# Patient Record
Sex: MALE | Race: WHITE
[De-identification: names, ages, dates, MRNs, and addresses within clinical notes are randomized per-mention and may not be internally consistent; named-entity substitution may affect disease eponyms.]

---

## 2017-06-22 ENCOUNTER — HOSPITAL ENCOUNTER (OUTPATIENT)
Dept: HOSPITAL 47 - LABWHC1 | Age: 62
Discharge: HOME | End: 2017-06-22
Payer: COMMERCIAL

## 2017-06-22 DIAGNOSIS — I25.10: Primary | ICD-10-CM

## 2017-06-22 DIAGNOSIS — E78.5: ICD-10-CM

## 2017-06-22 LAB
ALT SERPL-CCNC: 69 U/L (ref 21–72)
AST SERPL-CCNC: 27 U/L (ref 17–59)
CHOLEST SERPL-MCNC: 125 MG/DL (ref ?–200)
HDLC SERPL-MCNC: 32 MG/DL (ref 40–60)
TRIGL SERPL-MCNC: 172 MG/DL (ref ?–150)

## 2017-06-22 PROCEDURE — 84460 ALANINE AMINO (ALT) (SGPT): CPT

## 2017-06-22 PROCEDURE — 84450 TRANSFERASE (AST) (SGOT): CPT

## 2017-06-22 PROCEDURE — 80061 LIPID PANEL: CPT

## 2017-06-22 PROCEDURE — 36415 COLL VENOUS BLD VENIPUNCTURE: CPT

## 2022-01-13 ENCOUNTER — HOSPITAL ENCOUNTER (OUTPATIENT)
Dept: HOSPITAL 47 - LABWHC1 | Age: 67
Discharge: HOME | End: 2022-01-13
Attending: SURGERY
Payer: COMMERCIAL

## 2022-01-13 DIAGNOSIS — I71.4: ICD-10-CM

## 2022-01-13 DIAGNOSIS — Z01.812: Primary | ICD-10-CM

## 2022-01-13 LAB
ANION GAP SERPL CALC-SCNC: 10 MMOL/L
BASOPHILS # BLD AUTO: 0 K/UL (ref 0–0.2)
BASOPHILS NFR BLD AUTO: 1 %
BUN SERPL-SCNC: 23 MG/DL (ref 9–20)
CHLORIDE SERPL-SCNC: 96 MMOL/L (ref 98–107)
CO2 SERPL-SCNC: 29 MMOL/L (ref 22–30)
EOSINOPHIL # BLD AUTO: 0.2 K/UL (ref 0–0.7)
EOSINOPHIL NFR BLD AUTO: 2 %
ERYTHROCYTE [DISTWIDTH] IN BLOOD BY AUTOMATED COUNT: 4.86 M/UL (ref 4.3–5.9)
ERYTHROCYTE [DISTWIDTH] IN BLOOD: 13 % (ref 11.5–15.5)
HCT VFR BLD AUTO: 45 % (ref 39–53)
HGB BLD-MCNC: 15 GM/DL (ref 13–17.5)
LYMPHOCYTES # SPEC AUTO: 1.8 K/UL (ref 1–4.8)
LYMPHOCYTES NFR SPEC AUTO: 22 %
MCH RBC QN AUTO: 30.9 PG (ref 25–35)
MCHC RBC AUTO-ENTMCNC: 33.4 G/DL (ref 31–37)
MCV RBC AUTO: 92.5 FL (ref 80–100)
MONOCYTES # BLD AUTO: 0.4 K/UL (ref 0–1)
MONOCYTES NFR BLD AUTO: 5 %
NEUTROPHILS # BLD AUTO: 5.6 K/UL (ref 1.3–7.7)
NEUTROPHILS NFR BLD AUTO: 69 %
PLATELET # BLD AUTO: 279 K/UL (ref 150–450)
POTASSIUM SERPL-SCNC: 4.8 MMOL/L (ref 3.5–5.1)
SODIUM SERPL-SCNC: 135 MMOL/L (ref 137–145)
WBC # BLD AUTO: 8.1 K/UL (ref 3.8–10.6)

## 2022-01-13 PROCEDURE — 36415 COLL VENOUS BLD VENIPUNCTURE: CPT

## 2022-01-13 PROCEDURE — 80051 ELECTROLYTE PANEL: CPT

## 2022-01-13 PROCEDURE — 82565 ASSAY OF CREATININE: CPT

## 2022-01-13 PROCEDURE — 85025 COMPLETE CBC W/AUTO DIFF WBC: CPT

## 2022-01-13 PROCEDURE — 84520 ASSAY OF UREA NITROGEN: CPT

## 2022-01-18 ENCOUNTER — HOSPITAL ENCOUNTER (INPATIENT)
Dept: HOSPITAL 47 - 2ORMAIN | Age: 67
LOS: 2 days | Discharge: HOME | DRG: 269 | End: 2022-01-20
Attending: SURGERY | Admitting: SURGERY
Payer: COMMERCIAL

## 2022-01-18 VITALS — BODY MASS INDEX: 34.9 KG/M2

## 2022-01-18 DIAGNOSIS — I10: ICD-10-CM

## 2022-01-18 DIAGNOSIS — E11.51: ICD-10-CM

## 2022-01-18 DIAGNOSIS — I95.9: ICD-10-CM

## 2022-01-18 DIAGNOSIS — I67.9: ICD-10-CM

## 2022-01-18 DIAGNOSIS — Z86.79: ICD-10-CM

## 2022-01-18 DIAGNOSIS — I70.92: ICD-10-CM

## 2022-01-18 DIAGNOSIS — Z20.822: ICD-10-CM

## 2022-01-18 DIAGNOSIS — Z96.652: ICD-10-CM

## 2022-01-18 DIAGNOSIS — I70.222: ICD-10-CM

## 2022-01-18 DIAGNOSIS — I71.4: Primary | ICD-10-CM

## 2022-01-18 DIAGNOSIS — Z95.5: ICD-10-CM

## 2022-01-18 DIAGNOSIS — Z87.891: ICD-10-CM

## 2022-01-18 DIAGNOSIS — E78.5: ICD-10-CM

## 2022-01-18 DIAGNOSIS — I49.3: ICD-10-CM

## 2022-01-18 DIAGNOSIS — I74.3: ICD-10-CM

## 2022-01-18 DIAGNOSIS — I25.2: ICD-10-CM

## 2022-01-18 DIAGNOSIS — Z79.899: ICD-10-CM

## 2022-01-18 DIAGNOSIS — I72.3: ICD-10-CM

## 2022-01-18 DIAGNOSIS — I25.10: ICD-10-CM

## 2022-01-18 DIAGNOSIS — Z79.01: ICD-10-CM

## 2022-01-18 DIAGNOSIS — Z79.84: ICD-10-CM

## 2022-01-18 DIAGNOSIS — I48.0: ICD-10-CM

## 2022-01-18 DIAGNOSIS — Z79.82: ICD-10-CM

## 2022-01-18 LAB
ANION GAP SERPL CALC-SCNC: 12 MMOL/L
BASOPHILS # BLD AUTO: 0 K/UL (ref 0–0.2)
BASOPHILS # BLD AUTO: 0.1 K/UL (ref 0–0.2)
BASOPHILS NFR BLD AUTO: 0 %
BASOPHILS NFR BLD AUTO: 1 %
BUN SERPL-SCNC: 24 MG/DL (ref 9–20)
CALCIUM SPEC-MCNC: 9.6 MG/DL (ref 8.4–10.2)
CHLORIDE SERPL-SCNC: 98 MMOL/L (ref 98–107)
CO2 SERPL-SCNC: 27 MMOL/L (ref 22–30)
EOSINOPHIL # BLD AUTO: 0 K/UL (ref 0–0.7)
EOSINOPHIL # BLD AUTO: 0.2 K/UL (ref 0–0.7)
EOSINOPHIL NFR BLD AUTO: 0 %
EOSINOPHIL NFR BLD AUTO: 3 %
ERYTHROCYTE [DISTWIDTH] IN BLOOD BY AUTOMATED COUNT: 3.94 M/UL (ref 4.3–5.9)
ERYTHROCYTE [DISTWIDTH] IN BLOOD BY AUTOMATED COUNT: 5.04 M/UL (ref 4.3–5.9)
ERYTHROCYTE [DISTWIDTH] IN BLOOD: 13.5 % (ref 11.5–15.5)
ERYTHROCYTE [DISTWIDTH] IN BLOOD: 13.6 % (ref 11.5–15.5)
GLUCOSE BLD-MCNC: 158 MG/DL (ref 75–99)
GLUCOSE BLD-MCNC: 187 MG/DL (ref 75–99)
GLUCOSE BLD-MCNC: 205 MG/DL (ref 75–99)
GLUCOSE BLD-MCNC: 232 MG/DL (ref 75–99)
GLUCOSE SERPL-MCNC: 163 MG/DL (ref 74–99)
HCT VFR BLD AUTO: 36.8 % (ref 39–53)
HCT VFR BLD AUTO: 46.8 % (ref 39–53)
HGB BLD-MCNC: 12.1 GM/DL (ref 13–17.5)
HGB BLD-MCNC: 15.8 GM/DL (ref 13–17.5)
LYMPHOCYTES # SPEC AUTO: 0.7 K/UL (ref 1–4.8)
LYMPHOCYTES # SPEC AUTO: 2.3 K/UL (ref 1–4.8)
LYMPHOCYTES NFR SPEC AUTO: 26 %
LYMPHOCYTES NFR SPEC AUTO: 6 %
MCH RBC QN AUTO: 30.8 PG (ref 25–35)
MCH RBC QN AUTO: 31.3 PG (ref 25–35)
MCHC RBC AUTO-ENTMCNC: 33 G/DL (ref 31–37)
MCHC RBC AUTO-ENTMCNC: 33.8 G/DL (ref 31–37)
MCV RBC AUTO: 92.8 FL (ref 80–100)
MCV RBC AUTO: 93.3 FL (ref 80–100)
MONOCYTES # BLD AUTO: 0.2 K/UL (ref 0–1)
MONOCYTES # BLD AUTO: 0.6 K/UL (ref 0–1)
MONOCYTES NFR BLD AUTO: 2 %
MONOCYTES NFR BLD AUTO: 7 %
NEUTROPHILS # BLD AUTO: 10.3 K/UL (ref 1.3–7.7)
NEUTROPHILS # BLD AUTO: 5.3 K/UL (ref 1.3–7.7)
NEUTROPHILS NFR BLD AUTO: 62 %
NEUTROPHILS NFR BLD AUTO: 91 %
PLATELET # BLD AUTO: 206 K/UL (ref 150–450)
PLATELET # BLD AUTO: 287 K/UL (ref 150–450)
POTASSIUM SERPL-SCNC: 4.4 MMOL/L (ref 3.5–5.1)
SODIUM SERPL-SCNC: 137 MMOL/L (ref 137–145)
WBC # BLD AUTO: 11.3 K/UL (ref 3.8–10.6)
WBC # BLD AUTO: 8.6 K/UL (ref 3.8–10.6)

## 2022-01-18 PROCEDURE — 85730 THROMBOPLASTIN TIME PARTIAL: CPT

## 2022-01-18 PROCEDURE — 88304 TISSUE EXAM BY PATHOLOGIST: CPT

## 2022-01-18 PROCEDURE — 85027 COMPLETE CBC AUTOMATED: CPT

## 2022-01-18 PROCEDURE — 88311 DECALCIFY TISSUE: CPT

## 2022-01-18 PROCEDURE — 86901 BLOOD TYPING SEROLOGIC RH(D): CPT

## 2022-01-18 PROCEDURE — 047L3DZ DILATION OF LEFT FEMORAL ARTERY WITH INTRALUMINAL DEVICE, PERCUTANEOUS APPROACH: ICD-10-PCS

## 2022-01-18 PROCEDURE — 86850 RBC ANTIBODY SCREEN: CPT

## 2022-01-18 PROCEDURE — 04V03DZ RESTRICTION OF ABDOMINAL AORTA WITH INTRALUMINAL DEVICE, PERCUTANEOUS APPROACH: ICD-10-PCS

## 2022-01-18 PROCEDURE — 04CS0ZZ EXTIRPATION OF MATTER FROM LEFT POSTERIOR TIBIAL ARTERY, OPEN APPROACH: ICD-10-PCS

## 2022-01-18 PROCEDURE — 04CL0ZZ EXTIRPATION OF MATTER FROM LEFT FEMORAL ARTERY, OPEN APPROACH: ICD-10-PCS

## 2022-01-18 PROCEDURE — 047J3DZ DILATION OF LEFT EXTERNAL ILIAC ARTERY WITH INTRALUMINAL DEVICE, PERCUTANEOUS APPROACH: ICD-10-PCS

## 2022-01-18 PROCEDURE — 04CN0ZZ EXTIRPATION OF MATTER FROM LEFT POPLITEAL ARTERY, OPEN APPROACH: ICD-10-PCS

## 2022-01-18 PROCEDURE — 80048 BASIC METABOLIC PNL TOTAL CA: CPT

## 2022-01-18 PROCEDURE — 04CQ0ZZ EXTIRPATION OF MATTER FROM LEFT ANTERIOR TIBIAL ARTERY, OPEN APPROACH: ICD-10-PCS

## 2022-01-18 PROCEDURE — B41G1ZZ FLUOROSCOPY OF LEFT LOWER EXTREMITY ARTERIES USING LOW OSMOLAR CONTRAST: ICD-10-PCS

## 2022-01-18 PROCEDURE — 87635 SARS-COV-2 COVID-19 AMP PRB: CPT

## 2022-01-18 PROCEDURE — 85025 COMPLETE CBC W/AUTO DIFF WBC: CPT

## 2022-01-18 PROCEDURE — B41B1ZZ FLUOROSCOPY OF OTHER INTRA-ABDOMINAL ARTERIES USING LOW OSMOLAR CONTRAST: ICD-10-PCS

## 2022-01-18 PROCEDURE — 86900 BLOOD TYPING SEROLOGIC ABO: CPT

## 2022-01-18 RX ADMIN — CEFAZOLIN SCH: 330 INJECTION, POWDER, FOR SOLUTION INTRAMUSCULAR; INTRAVENOUS at 22:41

## 2022-01-18 RX ADMIN — HYDROMORPHONE HYDROCHLORIDE PRN MG: 1 INJECTION, SOLUTION INTRAMUSCULAR; INTRAVENOUS; SUBCUTANEOUS at 13:05

## 2022-01-18 RX ADMIN — CEFAZOLIN SCH: 330 INJECTION, POWDER, FOR SOLUTION INTRAMUSCULAR; INTRAVENOUS at 20:42

## 2022-01-18 RX ADMIN — POTASSIUM CHLORIDE SCH: 14.9 INJECTION, SOLUTION INTRAVENOUS at 20:41

## 2022-01-18 RX ADMIN — CEFAZOLIN SCH: 330 INJECTION, POWDER, FOR SOLUTION INTRAMUSCULAR; INTRAVENOUS at 22:42

## 2022-01-18 RX ADMIN — PRAVASTATIN SODIUM SCH: 40 TABLET ORAL at 20:43

## 2022-01-18 RX ADMIN — CEFAZOLIN SCH: 330 INJECTION, POWDER, FOR SOLUTION INTRAMUSCULAR; INTRAVENOUS at 20:41

## 2022-01-18 RX ADMIN — POTASSIUM CHLORIDE SCH MLS/HR: 14.9 INJECTION, SOLUTION INTRAVENOUS at 20:43

## 2022-01-18 RX ADMIN — INSULIN ASPART SCH UNIT: 100 INJECTION, SOLUTION INTRAVENOUS; SUBCUTANEOUS at 23:12

## 2022-01-18 RX ADMIN — CEFAZOLIN SCH: 330 INJECTION, POWDER, FOR SOLUTION INTRAMUSCULAR; INTRAVENOUS at 22:40

## 2022-01-18 RX ADMIN — HYDROMORPHONE HYDROCHLORIDE PRN MG: 1 INJECTION, SOLUTION INTRAMUSCULAR; INTRAVENOUS; SUBCUTANEOUS at 12:44

## 2022-01-18 NOTE — IR
EXAMINATION TYPE: IR stent intravas non coronary

 

DATE OF EXAM: 1/18/2022

 

COMPARISON: NONE

 

HISTORY: Fluoroscopy time.

 

Fluoroscopy was provided to the referring clinician.

## 2022-01-18 NOTE — P.OP
Date of Procedure: 01/18/22


Description of Procedure: 


Date: 01/18/2022





Preoperative diagnosis: Asymptomatic 0.1 cm Infrarenal abdominal aortic 

aneurysm, 2.8 cm left common iliac aneurysm


Postoperative diagnosis: Same


Procedure: Percutaneous Endovascular aortic repair with AFX II device under 

ultrasound guided access


Surgeon: Markell Costello DO


Assistant: none


Anesthesia:  GETA


EBL: 200 mL


Complications: None


Condition: Stable


Disposition:  Palpable femoral pulses bilaterally, monophasic DP signal 

bilaterally





Indication for procedure:  67-year-old gentleman with history of peripheral 

vascular disease and significant claudication in his left lower extremity 

presents to the hospital for endovascular aortic repair due to aneurysm noted on

recent CT angiogram measuring up to 6.1 cm.  He also has a left common iliac 

artery aneurysm measuring up to 2.8 cm.  Due to the fact that he has significant

atherosclerotic disease and claudication with occlusions noted in the distal SFA

as well as popliteal arteries it was determined that he would require an AFX 

graft to allow for up and over access in the future for his peripheral arterial 

disease.  He presents today for such procedure.





Operative Narrative:  After written and informed consent was obtained the 

patient all risks benefits and complications were described the patient is 

brought to the Cath Lab and laid in the supine position.  The area of the groins

were prepped and draped in usual sterile fashion after appropriate anesthetic 

was performed per the anesthesiologist.  A timeout was performed in normal 

fashion antibiotics were administered prior to accessed.  Under ultrasound 

guidance bilateral common femoral arteries were accessed and utilizing Seldinger

technique an 8-Georgian sheath was placed in the left femoral artery after 

Perclose closure device was placed 2  and 2 Perclose closure devices were 

placed in the right femoral artery followed by an 8-Georgian sheath.  035 

Glidewire was then placed through the 8-Georgian sheath and replaced with a stiff 

Lunderquist wire through a comfy catheter.  035 guidewire was then placed in the

7-Georgian sheath followed by pigtail catheter above the renal arteries at 

approximately L1-L2 vertebra level.  Guidewire was removed and aortogram was 

obtained.  The patient was then administered heparin and followed with serial 

ACTs for appropriate heparinization.  Attention was then first placed to the 

left common iliac artery and a 28 flared ovation limb measuring 100 mm was then 

deployed at the left common iliac artery and attempt to foreshorten the limb was

performed.  This was deployed with some coverage of the internal iliac artery 

and therefore multiple balloons and wires were then utilized to attempt to push 

the graft up into the iliac arteries aneurysm.  Once the flared graft was up 

above the internal iliac artery attention was then placed to the AFX graft 

deployment.  The 8-Georgian sheath was then removed and 19-Georgian AFX introducer 

sheath was guided over the stiff wire to the area above the bifurcation.  A 28 x

1 20 mm AFX2 bifurcated device was then guided onto the stiff wire.  The 

contralateral wire was then placed and through the 7-Georgian sheath it was then 

snared and brought out to the contralateral ovation sheath.  The main body of 

the AFX was then advanced to above the bifurcation in normal fashion and 

deployed above the bifurcation ultimately then pulled down to the bifurcation.  

Once main body was deployed attention was then placed to the contralateral limb 

which was deployed into the existing stent graft by removing the yellow loop 

cover.  A pigtail catheter was then placed over the wire and the contralateral 

wire was unlocked with the pigtail catheter.  Pigtail catheter was then placed 

above the renal arteries.  The ipsilateral limb was then obtained and the inner 

core and retracting AFX introducer sheath to deployed the ipsilateral limb.  

This inner deployment was removed and the obturator was placed and the sheath 

was then advanced through the above the renal arteries.  A 34 x 100 mm 

suprarenal AFX [place] cuff was then deployed beneath the renal arteries once land

ing zone was visualized.  Once completed the deployment sheath was removed and a

Q50 balloon was placed through the AFX introducer sheath and the overlap was 

ballooned.  The pigtail catheter was then withdrawn and placed within the [place] 

cuff in usual fashion.  Final angiogram was obtained demonstrating good seal 

with no evidence of endoleak.  All guidewires and catheters were then removed 

and the Perclose closure device was utilized for hemostasis for the bilateral 

femoral arteries.  There was still some bleeding at the left common femoral 

artery and therefore Angio-Seal was placed.  Bleeding did slow with pressure but

a FemoStop was placed to assist with hemostasis.  Once hemostatic the areas were

then cleansed and dressings were placed.  The patient tolerated the procedure 

well and had palpable femoral pulses at the conclusion of the procedure.  He was

then sent to PACU for recovery.

## 2022-01-18 NOTE — P.PN
Progress Note - Text


Progress Note Date: 01/18/22





called to the PACU secondary to patient complaining of pain in his left leg.  

Fem-stop removed without any pulse noted in the groin.  Left leg starting to 

mottle slightly.  Patient states he is unable to move left foot and leg.  Foot 

is cool to the touch. 





To operating room emergently for open thrombectomy and possible endarterectomy 

and iliac stenting.





Patient and wife updated.

## 2022-01-18 NOTE — P.OP
Date of Procedure: 01/18/22


Preoperative Diagnosis: 


Left lower extremity critical limb ischemia


Postoperative Diagnosis: 


Left lower extremity critical limb ischemia


 Left Iliac, femoral, popliteal artery thrombosis


 Chronic left SFA occlusion with reconstitution above knee popliteal artery


 Chronic tibial occlusive disease 


 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


Procedure(s) Performed: 





1.  Left femoral artery cutdown


2.  Open Iliac, femoral, popliteal and tibial thrombectomy


3.  Left femoral endarterectomy with patch angioplasty


4.  Selective left iliac, femoral angiogram


5.  Transluminal balloon angioplasty of the left external iliac artery with an 

8x20mm [place] balloon


6.  Transluminal stenting of the left external iliac and femoral artery with 

[id number] self expanding uncovered stent and [id number] Viabahn covered stent


7.  Selective left femoral, popliteal and tibial angiogram 2nd order


8.  Transluminal thrombectomy with Penumbra CAT8 device of the superficial 

femoral, popliteal and tibial arteries.


9.  Transluminal balloon angioplasty of the left superficial femoral artery


10. Transluminal stenting of the left superficial femoral artery with a [id number] 

Zilver PTX


11. Thrombolysis of the left tibial vessels


12. Incisional vac placement


[facility]


Surgeon: Markell Costello


Estimated Blood Loss (ml): 550


IV fluids (ml): 1,600


Urine output (ml): 600


Pathology: other (femoral plaque)


Condition: stable


Disposition: PACU


Indications for Procedure: 


67 year old gentleman with history of severe peripheral arterial disease and 

claudication whom underwent EVAR earlier in the day was found to have decreased 

blood flow to the left lower extremity.  Patient was examined and had absent 

femoral pulse on the left which was a change from post surgery.  He was also 

complaining of pain and his left leg began to mottle and therefore was taken to 

the operating room for an emergent thrombectomy and possible stenting.  When 

discussed with his wife she asked if we could fix both legs because he has been 

dealing with lower extremity pain for several months.


Operative Findings: 


dense calcified external iliac, femoral, profundus femoris, SFA and popliteal 

arteries.  Chronic occlusion of the tibial arteries with large collateralization

below the knee.


Description of Procedure: 


The patient was brought to the operative suite in an emergent fashion and laid 

in a supine position.  The area of the abdomen and left lower extremity were 

prepped and draped in the usual fashion.  Antibiotics were given and a timeout 

was performed in usual fashion.  A vertical incision was created at the left 

groin with a 10 blade scalpel and dissection was carried down to the common 

femoral artery.  The perclose and angioseal was removed and the common femoral, 

profunda and superficial femoral arteries were dissected free and controlled 

with vessel loops.  The femoral artery was severely calcified and no pulse was 

noted.  There was a pulse above this area at the level of the external iliac 

artery but dense calcification was also noted at that level.  Patient was given 

heparin and followed with serial ACTs and redosed as needed.  Proximal and 

distal control was then obtained and an arteriotomy was created with an 11 blade

scalpel and extended with Nguyễn Pinedo scissors.  Thrombus was encountered and 

thrombectomy was performed with a 4 valentine for the SFA and 5 valentine for the 

iliac artery with some thrombus removed.  Minimal backbleeding was noted.  Due 

to the severe calcification and disease an endarterectomy was performed and 

plaque was removed in usual fashion, profundus femoris has severe plaque at the 

orifice and eversion technique was performed to remove.  An improved pulse was 

noted proximally and patch angioplasty was performed with 6-0 prolene suture in 

a running fashion and a .8x8cm patch.  Once completed and all control was 

released there was diminished pulse and decision was made to access the patch 

and perform an angiogram.  An 8F sheath was placed after the patch was entered 

with a multipurpose needle and wire was placed.  Retrograde angiogram was 

obtained demonstrating diminished flow throughout the external iliac artery.  An

8x20mm balloon was then chosen to perform an angioplasty at the areas of 

stenosis and disease.  A [id number] uncovered stent was placed across the external 

iliac artery into the previous EVAR stent.  There was some improvement of flow 

but the external iliac femoral artery junction demonstrated severe plaque and 

slow flow.  A 10x100 Viabahn stent was then placed and extended into the patch. 

Bounding femoral pulse was noted and selective angiogram was then performed of 

the left leg.  Upon angiogram there was a chronic occlusion of the left SFA but 

there was dense thrombus at the reconstitution segment of the popliteal artery. 

A guidewire was then placed across the lesion and an over the wire was placed 

again with unsuccessful removal of the thrombus.  Due to the severity of the 

thrombus and critical nature an 8 Penumbra catheter was placed and suction 

thrombectomy was performed once the SFA lesion was crossed and ballooned.  

Multiple passes were performed with good removal of thrombus.  The lesion in the

SFA was severe and the decision to stent was made and a [id number] Zilver PTX stent

was placed.  Angiogram was obtained with brisk flow to the popliteal artery but 

there were severe collateral vessels with poor flow to the foot.  TPA and nitro 

were then administered to try to increase flow.  The patients foot was becoming 

pink with some capillary refill and therefore the procedure was concluded.  All 

guidewires and catheters were removed and sheath was removed.  The arteriotomy 

site was closed with a 6-0 prolene suture.  The area was copiously irrigated 

with antibiotic solution and hemostasis was controlled with Surgicel.  The 

incision was then closed in a multi-layer fashion and skin was cleansed and 

Provena incisional vac was placed.  The patient tolerated the procedure and had 

a palpable femoral and popliteal pulse at the conclusion of the procedure.  

There was slow capillary refill in the foot but the foot was pink.  He was sent 

to PACU for recovery and started on a heparin drip.

## 2022-01-19 LAB
ANION GAP SERPL CALC-SCNC: 9 MMOL/L
BUN SERPL-SCNC: 21 MG/DL (ref 9–20)
CALCIUM SPEC-MCNC: 8 MG/DL (ref 8.4–10.2)
CHLORIDE SERPL-SCNC: 105 MMOL/L (ref 98–107)
CO2 SERPL-SCNC: 21 MMOL/L (ref 22–30)
ERYTHROCYTE [DISTWIDTH] IN BLOOD BY AUTOMATED COUNT: 3.69 M/UL (ref 4.3–5.9)
ERYTHROCYTE [DISTWIDTH] IN BLOOD: 13.8 % (ref 11.5–15.5)
GLUCOSE BLD-MCNC: 162 MG/DL (ref 75–99)
GLUCOSE BLD-MCNC: 188 MG/DL (ref 75–99)
GLUCOSE BLD-MCNC: 192 MG/DL (ref 75–99)
GLUCOSE BLD-MCNC: 197 MG/DL (ref 75–99)
GLUCOSE SERPL-MCNC: 199 MG/DL (ref 74–99)
HCT VFR BLD AUTO: 34.5 % (ref 39–53)
HGB BLD-MCNC: 11.6 GM/DL (ref 13–17.5)
MCH RBC QN AUTO: 31.3 PG (ref 25–35)
MCHC RBC AUTO-ENTMCNC: 33.5 G/DL (ref 31–37)
MCV RBC AUTO: 93.4 FL (ref 80–100)
PLATELET # BLD AUTO: 219 K/UL (ref 150–450)
POTASSIUM SERPL-SCNC: 4.3 MMOL/L (ref 3.5–5.1)
SODIUM SERPL-SCNC: 135 MMOL/L (ref 137–145)
WBC # BLD AUTO: 11.3 K/UL (ref 3.8–10.6)

## 2022-01-19 RX ADMIN — INSULIN ASPART SCH UNIT: 100 INJECTION, SOLUTION INTRAVENOUS; SUBCUTANEOUS at 20:15

## 2022-01-19 RX ADMIN — APIXABAN SCH MG: 2.5 TABLET, FILM COATED ORAL at 20:15

## 2022-01-19 RX ADMIN — CEFAZOLIN SCH: 330 INJECTION, POWDER, FOR SOLUTION INTRAMUSCULAR; INTRAVENOUS at 09:17

## 2022-01-19 RX ADMIN — CEFAZOLIN SCH: 330 INJECTION, POWDER, FOR SOLUTION INTRAMUSCULAR; INTRAVENOUS at 09:23

## 2022-01-19 RX ADMIN — CEFAZOLIN SCH: 330 INJECTION, POWDER, FOR SOLUTION INTRAMUSCULAR; INTRAVENOUS at 09:22

## 2022-01-19 RX ADMIN — CEFAZOLIN SCH: 330 INJECTION, POWDER, FOR SOLUTION INTRAMUSCULAR; INTRAVENOUS at 09:19

## 2022-01-19 RX ADMIN — CEFAZOLIN SCH: 330 INJECTION, POWDER, FOR SOLUTION INTRAMUSCULAR; INTRAVENOUS at 09:21

## 2022-01-19 RX ADMIN — CEFAZOLIN SCH: 330 INJECTION, POWDER, FOR SOLUTION INTRAMUSCULAR; INTRAVENOUS at 09:18

## 2022-01-19 RX ADMIN — INSULIN ASPART SCH: 100 INJECTION, SOLUTION INTRAVENOUS; SUBCUTANEOUS at 05:57

## 2022-01-19 RX ADMIN — CEFAZOLIN SCH: 330 INJECTION, POWDER, FOR SOLUTION INTRAMUSCULAR; INTRAVENOUS at 09:24

## 2022-01-19 RX ADMIN — CEFAZOLIN SCH: 330 INJECTION, POWDER, FOR SOLUTION INTRAMUSCULAR; INTRAVENOUS at 09:12

## 2022-01-19 RX ADMIN — PRAVASTATIN SODIUM SCH MG: 40 TABLET ORAL at 20:15

## 2022-01-19 RX ADMIN — CEFAZOLIN SCH: 330 INJECTION, POWDER, FOR SOLUTION INTRAMUSCULAR; INTRAVENOUS at 09:13

## 2022-01-19 RX ADMIN — INSULIN ASPART SCH UNIT: 100 INJECTION, SOLUTION INTRAVENOUS; SUBCUTANEOUS at 17:56

## 2022-01-19 RX ADMIN — PIOGLITAZONE SCH MG: 15 TABLET ORAL at 09:28

## 2022-01-19 RX ADMIN — CEFAZOLIN SCH: 330 INJECTION, POWDER, FOR SOLUTION INTRAMUSCULAR; INTRAVENOUS at 09:20

## 2022-01-19 RX ADMIN — METOPROLOL TARTRATE SCH: 25 TABLET, FILM COATED ORAL at 17:56

## 2022-01-19 RX ADMIN — INSULIN ASPART SCH UNIT: 100 INJECTION, SOLUTION INTRAVENOUS; SUBCUTANEOUS at 11:55

## 2022-01-19 RX ADMIN — CEFAZOLIN SCH: 330 INJECTION, POWDER, FOR SOLUTION INTRAMUSCULAR; INTRAVENOUS at 09:11

## 2022-01-19 NOTE — P.CNPUL
History of Present Illness


Consult date: 01/19/22


Requesting physician: Markell Costello


Reason for consult: other (ICU management)


Chief complaint: Claudications in bilateral lower extremity, pain upon walking.


History of present illness: 





This is a 67-year-old white male with history of multiple medical problems 

including hypertension, coronary artery disease, severe peripheral vessel 

occlusive disease, and the patient presented with bilateral lower extremities 

claudications, pain upon walking even a few steps.  Patient had endovascular aor

tic repair due to aneurysm noted on recent angiogram measuring about 6.1 cm, 

patient underwent left common iliac artery aneurysm measuring 2.8 cm, patient 

had endovascular aortic repair with AFX11 device, postoperatively, patient was 

admitted to the intensive care unit, and I was asked to see him on consultation.

 During my evaluation, patient was on heparin drip, he was feeling much better, 

patient had no hemodynamic issues, no pulmonary issues, and I believe the 

patient is actually being considered to be discharged home today.  Most likely 

the patient will be discharged home on Eliquis and aspirin.  Patient has no 

shortness of breath no cough no wheezing no chest pain.  Apparently the patient 

was initially seen on January 11 by vascular surgery, and at that time he was 

noted to have abnormal CT angiogram of the abdomen and pelvis, clearly 

demonstrated a large abdominal aortic aneurysm.  The aneurysm measured 6.1 cm, 

and the patient was only able to walk less than 100 feet without significant 

pain in lower extremities.





Review of Systems





Constitutional: Negative.


HEENT: Negative.


Cardiac: Negative.


GI: Negative.


Genitourinary: Negative.


Musculoskeletal: Mostly severe claudications and pain upon walking


Endocrine: Negative


Hematologic: Negative


Psychiatric: Negative


Neurologic: Negative


Skin: Negative





Past Medical History


Past Medical History: Diabetes Mellitus, Hyperlipidemia, Hypertension, 

Myocardial Infarction (MI), Vascular Disorder


Additional Past Medical History / Comment(s): "two bad"  DISCS WITH BACK PAIN,  

VARICOSE VEINS, "abdominal" aneurysm


Last Myocardial Infarction Date:: 2013


History of Any Multi-Drug Resistant Organisms: None Reported


Past Surgical History: Heart Catheterization With Stent, Joint Replacement, 

Orthopedic Surgery


Additional Past Surgical History / Comment(s): LEFT SHOULDER Rotator cuff, 

VARICOSE VEINS, partial left knee replacement, arthroscopy madeline knee one cardiac 

stent


Past Anesthesia/Blood Transfusion Reactions: No Reported Reaction


Date of Last Stent Placement:: 2013


Smoking Status: Former smoker





- Past Family History


  ** Mother


Family Medical History: Cancer


Additional Family Medical History / Comment(s): OVARIAN CANCER





Medications and Allergies


                                Home Medications











 Medication  Instructions  Recorded  Confirmed  Type


 


Aspirin 325 mg PO DAILY 07/11/17 01/18/22 History


 


Atorvastatin [Lipitor] 40 mg PO DAILY 07/11/17 01/18/22 History


 


Loratadine-Pseudoeph  mg 1 each PO DAILY 07/11/17 01/18/22 History





[Claritin-D 24 Hr]    


 


Metoprolol Tartrate [Lopressor] 25 mg PO DAILY 07/11/17 01/18/22 History


 


Lisinopril-Hctz 10-12.5 mg 1 tab PO DAILY 01/12/22 01/18/22 History





[Zestoretic 10-12.5]    


 


Pioglitazone [Actos] 15 mg PO DAILY 01/12/22 01/18/22 History


 


metFORMIN  mg PO BID 01/12/22 01/18/22 History


 


Apixaban [Eliquis] 2.5 mg PO BID #60 tab 01/19/22  Rx








                                    Allergies











Allergy/AdvReac Type Severity Reaction Status Date / Time


 


Quinolones Allergy Unknown Rash/Hives Verified 01/12/22 15:38














Physical Exam


Vitals: 


                                   Vital Signs











  Temp Pulse Pulse Resp BP BP Pulse Ox


 


 01/19/22 12:00  98 F  87   22  87/62   91 L


 


 01/19/22 11:00   87   11 L  92/63   90 L


 


 01/19/22 10:00   122 H   11 L  112/73   93 L


 


 01/19/22 09:00   121 H   18  108/68   95


 


 01/19/22 08:00  98 F  114 H   12  119/70   94 L


 


 01/19/22 07:00   114 H   16  108/70   95


 


 01/19/22 06:00   115 H   12  104/73   95


 


 01/19/22 05:00   114 H   12  110/67   95


 


 01/19/22 04:00  97.6 F  114 H   16  109/72   96


 


 01/19/22 03:00   114 H   19  123/77   96


 


 01/19/22 02:00   112 H   16  115/64   96


 


 01/19/22 01:00   111 H   12  117/71   95


 


 01/19/22 00:40   112 H   14    96


 


 01/19/22 00:00  98.7 F  109 H   17  122/74   95


 


 01/18/22 23:00   105 H   18  130/76   94 L


 


 01/18/22 22:00   96   16  130/81   98


 


 01/18/22 21:00  97.7 F  98   16  120/75   95


 


 01/18/22 20:03        93 L


 


 01/18/22 19:46    93  18   139/58  96


 


 01/18/22 19:30    92  22   138/61  98


 


 01/18/22 19:16    93  22   133/61  97


 


 01/18/22 19:01    92  22   126/58  100


 


 01/18/22 18:53  96.4 F L   90  14   124/60  97


 


 01/18/22 13:33    93  16   167/89  92 L








                                Intake and Output











 01/18/22 01/19/22 01/19/22





 22:59 06:59 14:59


 


Intake Total 893 262.174 228.615


 


Output Total 1650 590 170


 


Balance -757 -327.826 58.615


 


Intake:   


 


   184 138


 


    LR @ 20 20 160 120


 


    Pressure bag 3 24 18


 


  Intake, IV Titration 20 78.174 90.615





  Amount   


 


    Heparin Sod,Pork in 0.45%  78.174 90.615





    NaCl 25,000 unit In 0.45   





    % NaCl 1 250ml.bag @ 8.   





    599 UNITS/KG/HR 10.001   





    mls/hr IV .Q24H [facility] Rx#:   





    [id number]   


 


    Lactated Ringers 1,000 ml 20  





    @ 20 mls/hr IV .Q24H [facility]   





    Rx#:[id number]   


 


Output:   


 


  Urine 1100 590 170


 


  Estimated Blood Loss 550  


 


Other:   


 


  Voiding Method Indwelling Catheter Indwelling Catheter Indwelling Catheter


 


  # Bowel Movements   1


 


  Weight  119.3 kg 








                         ABP, PAP, CO, CI - Last 8 Hours











Arterial Blood Pressure        96/55


 


Arterial Blood Pressure        81/41


 


Arterial Blood Pressure        110/64


 


Arterial Blood Pressure        101/66


 


Arterial Blood Pressure        131/60


 


Arterial Blood Pressure        114/57


 


Arterial Blood Pressure        109/48

















Physical Exam: Revealed 67-year-old white male in no distress.  Head: 

Atraumatic, normocephalic.


Head: Atraumatic normocephalic 


HEENT:[Neck is supple.] [No neck masses.] [No thyromegaly.] [No JVD.]


Chest: [Clear throughout, no crackles, no rhonchi, no wheezes.]


Cardiac Exam: [Normal S1 and S2, no S3 gallop, no murmur.]


Abdomen: [Soft, nontender,  no megaly, no rebound, no guarding, normal bowel 

sounds.]


Extremities: [No clubbing, no edema, no cyanosis.]


Neurological Exam: [No focal neurologic deficit.]





Results





- Laboratory Findings


CBC and BMP: 


                                 01/19/22 02:30





                                 01/19/22 02:30


Abnormal lab findings: 


                                  Abnormal Labs











  01/18/22 01/18/22 01/18/22





  07:03 07:13 12:26


 


WBC   


 


RBC   


 


Hgb   


 


Hct   


 


Neutrophils #   


 


Lymphocytes #   


 


APTT   


 


Sodium   


 


Carbon Dioxide   


 


BUN  24 H  


 


Glucose  163 H  


 


POC Glucose (mg/dL)   158 H  187 H


 


Calcium   














  01/18/22 01/18/22 01/18/22





  20:06 20:30 23:09


 


WBC   11.3 H 


 


RBC   3.94 L 


 


Hgb   12.1 L D 


 


Hct   36.8 L 


 


Neutrophils #   10.3 H 


 


Lymphocytes #   0.7 L 


 


APTT   


 


Sodium   


 


Carbon Dioxide   


 


BUN   


 


Glucose   


 


POC Glucose (mg/dL)  205 H   232 H


 


Calcium   














  01/19/22 01/19/22 01/19/22





  02:30 02:30 02:30


 


WBC    11.3 H


 


RBC    3.69 L


 


Hgb    11.6 L


 


Hct    34.5 L


 


Neutrophils #   


 


Lymphocytes #   


 


APTT  32.8 H  


 


Sodium   135 L 


 


Carbon Dioxide   21 L 


 


BUN   21 H 


 


Glucose   199 H 


 


POC Glucose (mg/dL)   


 


Calcium   8.0 L 














  01/19/22 01/19/22 01/19/22





  05:55 10:00 11:35


 


WBC   


 


RBC   


 


Hgb   


 


Hct   


 


Neutrophils #   


 


Lymphocytes #   


 


APTT   36.5 H 


 


Sodium   


 


Carbon Dioxide   


 


BUN   


 


Glucose   


 


POC Glucose (mg/dL)  188 H   192 H


 


Calcium   














Assessment and Plan


Assessment: 





Impression:


Status post endovascular aortic repair with AFX11 device, postoperative day #1.


Status post open even BX, femoral, popliteal, and tibial thrombectomy


Status post left femoral endarterectomy and patch angioplasty


Status post transluminal balloon angioplasty of the left external iliac artery


Status post transluminal angioplasty of the left femoral superficial artery


Status post transluminal stenting of the superficial femoral artery


Status post thrombolysis of the left tibial vessels


Status post incisional VAC placement.


History of severe peripheral vessel occlusive disease


History of abdominal aortic aneurysm


History of coronary arteriosclerosis.


History of hypertension


History of type 2 diabetes.





Recommendation:


Continue present supportive care measures and patient is going to be discharged 

home today, presently on heparin.


Resume home meds.


Patient will likely be discharged home on Eliquis and aspirin


Resume lisinopril, Actos, metformin, and metoprolol/home medications.


Will follow as needed.








Time with Patient: Greater than 30

## 2022-01-19 NOTE — P.CONS
History of Present Illness





- Reason for Consult


Perioperative hypotension





- History of Present Illness


Patient when 67-year-old male is admitted for severe peripheral vascular disease

and limb threatening ischemia of the left leg found to have complete occlusion 

of the left femoral artery.  Patient initially underwent the stenting to the 

left femoral postoperatively found to have significant compromise to left leg 

circulation because of which patient was taken to or again and patient had an 

endarterectomy.  Postoperatively patient is hypotensive patient is on 

hydrocodone presently lisinopril at home which is being held now.  Patient has 

irregular heartbeat which is secondary to PVCs.  Patient is also on anti-

correlation at home probably for atrial fibrillation paroxysmal.  Patient quit 

smoking about 8 years ago does have history of coronary artery disease and 

multiple other medical problems.  Patient has surgical drain in the left groin 

area.











REVIEW OF SYSTEMS: 


CONSTITUTIONAL: No fever, no malaise, no fatigue. 


HEENT: No recent visual problems or hearing problems. Denied any sore throat. 


CARDIOVASCULAR: No chest pain, orthopnea, PND, no palpitations, no syncope. 


PULMONARY: No shortness of breath, no cough, no hemoptysis. 


GASTROINTESTINAL: No diarrhea, no nausea, no vomiting, no abdominal pain. 


NEUROLOGICAL: No headaches, no weakness, no numbness. 


HEMATOLOGICAL: Denies any bleeding or petechiae. 


GENITOURINARY: Denies any burning micturition, frequency, or urgency. 


MUSCULOSKELETAL/RHEUMATOLOGICAL: Denies any joint pain, swelling, or any muscle 

pain. 


ENDOCRINE: Denies any polyuria or polydipsia. 





The rest of the 14-point review of systems is negative.











PHYSICAL EXAMINATION: 





GENERAL: The patient is alert and oriented x3, not in any acute distress. Well 

developed, well nourished. 


HEENT: Pupils are round and equally reacting to light. EOMI. No scleral icterus.

No conjunctival pallor. Normocephalic, atraumatic. No pharyngeal erythema. No 

thyromegaly. 


CARDIOVASCULAR: S1 and S2 present. No murmurs, rubs, or gallops. 


PULMONARY: Chest is clear to auscultation, no wheezing or crackles. 


ABDOMEN: Soft, nontender, nondistended, normoactive bowel sounds. No palpable 

organomegaly. 


MUSCULOSKELETAL: No joint swelling or deformity.


EXTREMITIES: Left sided femoral endarterectomy post surgical drain 


NEUROLOGICAL: Gross neurological examination did not reveal any focal deficits. 


SKIN: No rashes. 





Assessment and plan


-Severe peripheral vascular disease and limb threatening ischemia on the left 

leg status post endovascular I decreased.,  Left femoral popliteal and tibial 

tibial thrombectomy and endarterectomy and patch anoplasty.  Patient is on 

Eliquis and the antiplatelet therapy.  Patient presently has a wound VAC in 

place


-Perioperative hypotension which is not unexpected lisinopril, 

hydrochlorothiazide will be held continue with metoprolol


-Possible history of paroxysmal A. fib patient is presently frequent PVCs is on 

anticoagulation which will be continued along with beta blocker.


-Cerebrovascular disease


-: Artery disease


-Hypertension presently hypotensive which is not unexpected in the perioperative

period


-Type 2 diabetes mellitus hold off on metformin because patient received 

contrast continue with sliding scale insulin.  


DVT prophylaxis: On Eliquis














Past Medical History


Past Medical History: Diabetes Mellitus, Hyperlipidemia, Hypertension, 

Myocardial Infarction (MI), Vascular Disorder


Additional Past Medical History / Comment(s): "two bad"  DISCS WITH BACK PAIN,  

VARICOSE VEINS, "abdominal" aneurysm


Last Myocardial Infarction Date:: 2013


History of Any Multi-Drug Resistant Organisms: None Reported


Past Surgical History: Heart Catheterization With Stent, Joint Replacement, 

Orthopedic Surgery


Additional Past Surgical History / Comment(s): LEFT SHOULDER Rotator cuff, 

VARICOSE VEINS, partial left knee replacement, arthroscopy madeline knee one cardiac 

stent


Past Anesthesia/Blood Transfusion Reactions: No Reported Reaction


Date of Last Stent Placement:: 2013


Smoking Status: Former smoker





- Past Family History


  ** Mother


Family Medical History: Cancer


Additional Family Medical History / Comment(s): OVARIAN CANCER





Medications and Allergies


                                Home Medications











 Medication  Instructions  Recorded  Confirmed  Type


 


Aspirin 325 mg PO DAILY 07/11/17 01/18/22 History


 


Atorvastatin [Lipitor] 40 mg PO DAILY 07/11/17 01/18/22 History


 


Loratadine-Pseudoeph  mg 1 each PO DAILY 07/11/17 01/18/22 History





[Claritin-D 24 Hr]    


 


Metoprolol Tartrate [Lopressor] 25 mg PO DAILY 07/11/17 01/18/22 History


 


Pioglitazone [Actos] 15 mg PO DAILY 01/12/22 01/18/22 History


 


metFORMIN  mg PO BID 01/12/22 01/18/22 History


 


Apixaban [Eliquis] 2.5 mg PO BID #60 tab 01/19/22  Rx


 


lisinopriL [Zestril] 5 mg PO DAILY #30 tab 01/19/22  Rx








                                    Allergies











Allergy/AdvReac Type Severity Reaction Status Date / Time


 


Quinolones Allergy Unknown Rash/Hives Verified 01/12/22 15:38














Physical Exam


Vitals: 


                                   Vital Signs











  Temp Pulse Pulse Resp BP BP Pulse Ox


 


 01/19/22 15:00   107 H   17  91/51   92 L


 


 01/19/22 14:00   103 H   21  101/59   90 L


 


 01/19/22 13:00   103 H   26 H  118/65   92 L


 


 01/19/22 12:00  98 F  87   22  87/62   91 L


 


 01/19/22 11:00   87   11 L  92/63   90 L


 


 01/19/22 10:00   122 H   11 L  112/73   93 L


 


 01/19/22 09:00   121 H   18  108/68   95


 


 01/19/22 08:00  98 F  114 H   12  119/70   94 L


 


 01/19/22 07:00   114 H   16  108/70   95


 


 01/19/22 06:00   115 H   12  104/73   95


 


 01/19/22 05:00   114 H   12  110/67   95


 


 01/19/22 04:00  97.6 F  114 H   16  109/72   96


 


 01/19/22 03:00   114 H   19  123/77   96


 


 01/19/22 02:00   112 H   16  115/64   96


 


 01/19/22 01:00   111 H   12  117/71   95


 


 01/19/22 00:40   112 H   14    96


 


 01/19/22 00:00  98.7 F  109 H   17  122/74   95


 


 01/18/22 23:00   105 H   18  130/76   94 L


 


 01/18/22 22:00   96   16  130/81   98


 


 01/18/22 21:00  97.7 F  98   16  120/75   95


 


 01/18/22 20:03        93 L


 


 01/18/22 19:46    93  18   139/58  96


 


 01/18/22 19:30    92  22   138/61  98


 


 01/18/22 19:16    93  22   133/61  97


 


 01/18/22 19:01    92  22   126/58  100


 


 01/18/22 18:53  96.4 F L   90  14   124/60  97








                                Intake and Output











 01/19/22 01/19/22 01/19/22





 06:59 14:59 22:59


 


Intake Total 262.174 574.615 123


 


Output Total 590 170 0


 


Balance -327.826 404.615 123


 


Intake:   


 


   184 23


 


    LR @ 20 160 160 20


 


    Pressure bag 24 24 3


 


  Intake, IV Titration 78.174 90.615 





  Amount   


 


    Heparin Sod,Pork in 0.45% 78.174 90.615 





    NaCl 25,000 unit In 0.45   





    % NaCl 1 250ml.bag @ 8.   





    599 UNITS/KG/HR 10.001   





    mls/hr IV .Q24H [facility] Rx#:   





    [id number]   


 


  Oral  300 100


 


Output:   


 


  Urine 590 170 0


 


Other:   


 


  Voiding Method Indwelling Catheter Indwelling Catheter 


 


  # Bowel Movements  1 


 


  Weight 119.3 kg  








                         ABP, PAP, CO, CI - Last 8 Hours











Arterial Blood Pressure        91/41


 


Arterial Blood Pressure        97/49


 


Arterial Blood Pressure        96/55


 


Arterial Blood Pressure        81/41


 


Arterial Blood Pressure        110/64


 


Arterial Blood Pressure        101/66


 


Arterial Blood Pressure        131/60

















Results


CBC & Chem 7: 


                                 01/19/22 02:30





                                 01/19/22 02:30


Labs: 


                  Abnormal Lab Results - Last 24 Hours (Table)











  01/18/22 01/18/22 01/18/22 Range/Units





  20:06 20:30 23:09 


 


WBC   11.3 H   (3.8-10.6)  k/uL


 


RBC   3.94 L   (4.30-5.90)  m/uL


 


Hgb   12.1 L D   (13.0-17.5)  gm/dL


 


Hct   36.8 L   (39.0-53.0)  %


 


Neutrophils #   10.3 H   (1.3-7.7)  k/uL


 


Lymphocytes #   0.7 L   (1.0-4.8)  k/uL


 


APTT     (22.0-30.0)  sec


 


Sodium     (137-145)  mmol/L


 


Carbon Dioxide     (22-30)  mmol/L


 


BUN     (9-20)  mg/dL


 


Glucose     (74-99)  mg/dL


 


POC Glucose (mg/dL)  205 H   232 H  (75-99)  mg/dL


 


Calcium     (8.4-10.2)  mg/dL














  01/19/22 01/19/22 01/19/22 Range/Units





  02:30 02:30 02:30 


 


WBC    11.3 H  (3.8-10.6)  k/uL


 


RBC    3.69 L  (4.30-5.90)  m/uL


 


Hgb    11.6 L  (13.0-17.5)  gm/dL


 


Hct    34.5 L  (39.0-53.0)  %


 


Neutrophils #     (1.3-7.7)  k/uL


 


Lymphocytes #     (1.0-4.8)  k/uL


 


APTT  32.8 H    (22.0-30.0)  sec


 


Sodium   135 L   (137-145)  mmol/L


 


Carbon Dioxide   21 L   (22-30)  mmol/L


 


BUN   21 H   (9-20)  mg/dL


 


Glucose   199 H   (74-99)  mg/dL


 


POC Glucose (mg/dL)     (75-99)  mg/dL


 


Calcium   8.0 L   (8.4-10.2)  mg/dL














  01/19/22 01/19/22 01/19/22 Range/Units





  05:55 10:00 11:35 


 


WBC     (3.8-10.6)  k/uL


 


RBC     (4.30-5.90)  m/uL


 


Hgb     (13.0-17.5)  gm/dL


 


Hct     (39.0-53.0)  %


 


Neutrophils #     (1.3-7.7)  k/uL


 


Lymphocytes #     (1.0-4.8)  k/uL


 


APTT   36.5 H   (22.0-30.0)  sec


 


Sodium     (137-145)  mmol/L


 


Carbon Dioxide     (22-30)  mmol/L


 


BUN     (9-20)  mg/dL


 


Glucose     (74-99)  mg/dL


 


POC Glucose (mg/dL)  188 H   192 H  (75-99)  mg/dL


 


Calcium     (8.4-10.2)  mg/dL

## 2022-01-19 NOTE — FL
Fluoroscopy

 

INDICATION: Pain

 

FINDINGS:

 

Fluoroscopy time: 25 minutes 25 seconds.

 

Images obtained: 644.

 

IMPRESSIONS:

1. Documentation of fluoroscopy.

## 2022-01-19 NOTE — P.DS
Providers


Date of admission: 


01/18/22 06:39





Expected date of discharge: 01/19/22


Attending physician: 


Markell Costello DO





Consults: 





                                        





01/18/22 05:52


Consult to Anesthesia Routine 


   Consulting Provider: Anesthesia,Services


   Consult Reason/Comments: General anesthesia for Aortic Stent procedure





01/18/22 12:19


Consult Physician Routine 


   Consulting Provider: Óscar Green


   Consult Reason/Comments: medical management


   Do you want consulting provider notified?: Yes





01/18/22 19:39


Consult Physician Routine 


   Consulting Provider: Theodora Pandya


   Consult Reason/Comments: ICU management


   Do you want consulting provider notified?: Yes











Primary care physician: 


[facility]





[facility] Course: 





This is a 67-year-old male with a history of peripheral vascular disease and 

significant claudication in bilateral lower extremities and presented to the 

hospital for endovascular aortic repair due to aneurysm noted on recent CT 

angiogram measuring up to 6.1 cm who also had a left common iliac artery 

aneurysm measuring up to 2.8 cm.  He is postop day #1 for endovascular aortic 

repair with AFXII device.  During his recovery patient had been complaining of 

pain in the left leg, he was evaluated with left lower extremity mottling noted 

and unable to palpate any pulse.  The patient was brought back to the operating 

room for left lower extremity critical limb ischemia.  





Today he is seen and examined in the ICU.  He is on a heparin drip.  Left lower 

extremity pain has improved.  It is warm to the touch he is able to wiggle his 

toes and move his leg.  Incision is intact with Prevena wound vac.  It has 

bilateral PT and DP Doppler signals.  Patient is afebrile.  WBC 11.3 hemoglobin 

11.6 platelet count 219,000





Plan:


1.  Increase ambulation


2.  Discontinue Parham catheter


3.  Patient may have regular diet


4.  Discontinue heparin and start Eliquis 2.5 mg twice a day


5.  Continue aspirin 325 daily 


Possible discharge later today or tomorrow with follow-up with Dr. Costello in 1 

week














The impression and plan of care has been dictated as directed.





Dr. Costello


I performed a history and examination of this patient,  discussed the same with 

the dictator.  I agree with the dictator's note ,documented as a scribe.  Any 

additional findings or plans will be noted.


Procedures: 





1.  Percutaneous endovascular aortic repair with AFXII device ybder ultrasound 

guided access








1.  Left femoral artery cutdown


2.  Open Iliac, femoral, popliteal and tibial thrombectomy


3.  Left femoral endarterectomy with patch angioplasty


4.  Selective left iliac, femoral angiogram


5.  Transluminal balloon angioplasty of the left external iliac artery with an 

8x20mm Jerusalem balloon


6.  Transluminal stenting of the left external iliac and femoral artery with 

[id number] self expanding uncovered stent and [id number] Viabahn covered stent


7.  Selective left femoral, popliteal and tibial angiogram 2nd order


8.  Transluminal thrombectomy with Penumbra CAT8 device of the superficial 

femoral, popliteal and tibial arteries.


9.  Transluminal balloon angioplasty of the left superficial femoral artery


10. Transluminal stenting of the left superficial femoral artery with a [id number] 

Zilver PTX


11. Thrombolysis of the left tibial vessels


12. Incisional vac placement








Plan - Discharge Summary


Discharge Rx Participant: Yes


New Discharge Prescriptions: 


New


   lisinopriL [Zestril] 5 mg PO DAILY #30 tab


   Apixaban [Eliquis] 2.5 mg PO BID #60 tab





Discontinued


   Lisinopril-Hctz 10-12.5 mg [Zestoretic 10-12.5] 1 tab PO DAILY





No Action


   Metoprolol Tartrate [Lopressor] 25 mg PO DAILY


   Atorvastatin [Lipitor] 40 mg PO DAILY


   Aspirin 325 mg PO DAILY


   Loratadine-Pseudoeph  mg [Claritin-D 24 Hr] 1 each PO DAILY


   metFORMIN  mg PO BID


   Pioglitazone [Actos] 15 mg PO DAILY


Discharge Medication List





Aspirin 325 mg PO DAILY 07/11/17 [History]


Atorvastatin [Lipitor] 40 mg PO DAILY 07/11/17 [History]


Loratadine-Pseudoeph  mg [Claritin-D 24 Hr] 1 each PO DAILY 07/11/17 

[History]


Metoprolol Tartrate [Lopressor] 25 mg PO DAILY 07/11/17 [History]


Pioglitazone [Actos] 15 mg PO DAILY 01/12/22 [History]


metFORMIN  mg PO BID 01/12/22 [History]


Apixaban [Eliquis] 2.5 mg PO BID #60 tab 01/19/22 [Rx]


lisinopriL [Zestril] 5 mg PO DAILY #30 tab 01/19/22 [Rx]








Follow up Appointment(s)/Referral(s): 


Markell Costello DO [STAFF PHYSICIAN] - 1 Week


Óscar Green DO [Primary Care Provider] - 1 Week


Patient Instructions/Handouts:  Apixaban (By mouth), Endovascular Aneurysm 

Repair of Abdominal Aorta (DC)


Activity/Diet/Wound Care/Special Instructions: 


Keep Prevena wound Vac in place for 7 days.  No tub bathing or soaking.  No 

heavy lifting greater than 5-10 pounds and no strenuous activity.  Encourage 

ambulation.








Discharge Disposition: HOME SELF-CARE

## 2022-01-20 VITALS — TEMPERATURE: 99.2 F

## 2022-01-20 VITALS — HEART RATE: 91 BPM | RESPIRATION RATE: 16 BRPM

## 2022-01-20 VITALS — DIASTOLIC BLOOD PRESSURE: 74 MMHG | SYSTOLIC BLOOD PRESSURE: 108 MMHG

## 2022-01-20 LAB
GLUCOSE BLD-MCNC: 186 MG/DL (ref 75–99)
GLUCOSE BLD-MCNC: 188 MG/DL (ref 75–99)

## 2022-01-20 RX ADMIN — PIOGLITAZONE SCH MG: 15 TABLET ORAL at 08:48

## 2022-01-20 RX ADMIN — INSULIN ASPART SCH UNIT: 100 INJECTION, SOLUTION INTRAVENOUS; SUBCUTANEOUS at 06:48

## 2022-01-20 RX ADMIN — POTASSIUM CHLORIDE SCH: 14.9 INJECTION, SOLUTION INTRAVENOUS at 04:26

## 2022-01-20 RX ADMIN — APIXABAN SCH MG: 2.5 TABLET, FILM COATED ORAL at 08:48

## 2022-01-20 RX ADMIN — METOPROLOL TARTRATE SCH MG: 25 TABLET, FILM COATED ORAL at 08:48

## 2022-01-20 RX ADMIN — INSULIN ASPART SCH UNIT: 100 INJECTION, SOLUTION INTRAVENOUS; SUBCUTANEOUS at 11:33

## 2022-01-20 NOTE — P.PN
Subjective


Progress Note Date: 01/20/22














- Reason for Consult


Perioperative hypotension





- History of Present Illness


Patient when 67-year-old male is admitted for severe peripheral vascular disease

and limb threatening ischemia of the left leg found to have complete occlusion 

of the left femoral artery.  Patient initially underwent the stenting to the 

left femoral postoperatively found to have significant compromise to left leg 

circulation because of which patient was taken to or again and patient had an 

endarterectomy.  Postoperatively patient is hypotensive patient is on 

hydrocodone presently lisinopril at home which is being held now.  Patient has 

irregular heartbeat which is secondary to PVCs.  Patient is also on anti-

correlation at home probably for atrial fibrillation paroxysmal.  Patient quit 

smoking about 8 years ago does have history of coronary artery disease and 

multiple other medical problems.  Patient has surgical drain in the left groin 

area.





01/20/2022


Patient is seen and evaluated this morning and follow-up continues to be in the 

ICU closely monitored.  Patient is status post endovascular aortic repair with 

AFXII device and also underwent left femoral artery cutdown with open iliac 

femoral popliteal and tibial thrombectomy with left femoral endarterectomy and 

patch angioplasty with vascular surgery.  Plan is to discharge today and patient

was evaluated for some postoperative hypotension which is an expected 

possibility given recent surgery.  Patient normally takes 

lisinopril/hydrochlorothiazide which has been discontinued and patient will 

continue on low-dose lisinopril 5 mg and encourage the patient to monitor blood 

pressure closely.  Patient also started on Eliquis and will have close 

outpatient follow-up with Dr. turner vascular surgery in one week.  Patient 

encouraged to follow-up with primary care provider on discharge as well. 





Review of systems:


Constitutional: No reports of fatigue, fever, or chills


Cardiovascular: No reports of chest pain or palpitations


Respiratory: No reports of shortness of breath or cough


GI: No reports of nausea, vomiting, or diarrhea


: No reports of dysuria or retention


Neurovascular: No reports of weakness or numbness





All medications have been reviewed





Active Medications





Alprazolam (Alprazolam 0.25 Mg Tab)  0.25 mg PO Q6HR PRN


   PRN Reason: Mild Anxiety


Apixaban (Apixaban 2.5 Mg Tablet)  2.5 mg PO BID [facility]; Protocol


   Last Admin: 01/20/22 08:48 Dose:  2.5 mg


   Documented by: 


Aspirin (Aspirin 325 Mg Tab)  325 mg PO DAILY [facility]


   Last Admin: 01/20/22 08:48 Dose:  325 mg


   Documented by: 


Lactated Ringer's (Lactated Ringers)  1,000 mls @ 20 mls/hr IV .Q24H [facility]


   Last Admin: 01/20/22 04:26 Dose:  Not Given


   Documented by: 


Insulin Aspart (Insulin Aspart (Novolog) 100 Unit/Ml Vial)  0 unit SQ ACHS [facility]; 

Protocol


   Last Admin: 01/20/22 11:33 Dose:  3 unit


   Documented by: 


Lidocaine HCl (Lidocaine 1% (10mg/Ml) For Iv Start)  0.1 ml INTRADERMA PER 

PROTOCOL PRN


   PRN Reason: IV Start


Metoprolol Tartrate (Metoprolol Tartrate 25 Mg Tab)  25 mg PO BID [facility]


   Last Admin: 01/20/22 08:48 Dose:  25 mg


   Documented by: 


Naloxone HCl (Naloxone 0.4 Mg/Ml 10 Ml Vial)  0.2 mg IVP Q2M PRN


   PRN Reason: Opioid Reversal


Pioglitazone HCl (Pioglitazone 15 Mg Tab)  15 mg PO DAILY [facility]


   Last Admin: 01/20/22 08:48 Dose:  15 mg


   Documented by: 


Pravastatin Sodium (Pravastatin Sodium 40 Mg Tab)  40 mg PO HS [facility]


   Last Admin: 01/19/22 20:15 Dose:  40 mg


   Documented by: 


Pseudoephedrine HCl (Pseudoephedrine 12hr 120 Mg Tablet.Er)  1 mg PO Q12HR PRN


   PRN Reason: CONGESTION


Zolpidem Tartrate (Zolpidem 5 Mg Tab)  5 mg PO HS PRN


   PRN Reason: Insomnia











PHYSICAL EXAMINATION: 





GENERAL: The patient is alert and oriented x3, not in any acute distress. Well 

developed, well nourished. 


HEENT: Pupils are round and equally reacting to light. EOMI. No scleral icterus.

No conjunctival pallor. Normocephalic, atraumatic. No pharyngeal erythema. No 

thyromegaly. 


CARDIOVASCULAR: S1 and S2 present. No murmurs, rubs, or gallops. 


PULMONARY: Chest is clear to auscultation, no wheezing or crackles. 


ABDOMEN: Soft, nontender, nondistended, normoactive bowel sounds. No palpable 

organomegaly. 


MUSCULOSKELETAL: No joint swelling or deformity.


EXTREMITIES: Left sided femoral endarterectomy post surgical drain wound VAC 


NEUROLOGICAL: Gross neurological examination did not reveal any focal deficits. 


SKIN: No rashes. 





Assessment and plan:





-Severe peripheral vascular disease and limb  ischemia on the left leg status 

post endovascular intervention.,  Left femoral popliteal and tibial tibial 

thrombectomy and endarterectomy and patch anoplasty.  Patient is on Eliquis and 

dual antiplatelet therapy.  Patient presently has a wound VAC in place Which 

will continue on discharge per vascular surgery


-Perioperative hypotension which is not unexpected; patient was taking 

lisinopril/hydrochlorothiazide and will discontinue and continue with low-dose 

lisinopril and encourage the patient to monitor blood pressure and keep a diary 

for primary care follow-up


-Possible history of paroxysmal A. fib patient is presently frequent PVCs is on 

anticoagulation which will be continued along with beta blocker.


-Cerebrovascular disease


-Peripheral Artery disease


-Hypertension presently hypotensive which is not unexpected in the perioperative

period,  improved


-Type 2 diabetes mellitus hold off on metformin because patient received 

contrastand okay to resume in 2 days 


-DVT prophylaxis: On Eliquis








Objective





- Vital Signs


Vital signs: 


                                   Vital Signs











Temp  99.4 F   01/20/22 00:00


 


Pulse  114 H  01/20/22 07:00


 


Resp  16   01/20/22 07:00


 


BP  125/63   01/20/22 07:00


 


Pulse Ox  92 L  01/20/22 07:00








                                 Intake & Output











 01/19/22 01/20/22 01/20/22





 18:59 06:59 18:59


 


Intake Total 1100.445 5432 


 


Output Total 170 1450 


 


Balance 893.615 -330 


 


Intake:   


 


   140 


 


    LR @ 20 240 140 


 


    Pressure bag 33  


 


  Intake, IV Titration 90.615  





  Amount   


 


    Heparin Sod,Pork in 0.45% 90.615  





    NaCl 25,000 unit In 0.45   





    % NaCl 1 250ml.bag @ 8.   





    599 UNITS/KG/HR 10.001   





    mls/hr IV .Q24H [facility] Rx#:   





    [id number]   


 


  Oral 700 740 


 


  Tube Feeding  240 


 


Output:   


 


  Urine 170 1450 


 


Other:   


 


  Voiding Method Indwelling Catheter Urinal 


 


  # Bowel Movements 1  








                       ABP, PAP, CO, CI - Last Documented











Arterial Blood Pressure        91/41

















- Labs


CBC & Chem 7: 


                                 01/19/22 02:30





                                 01/19/22 02:30


Labs: 


                  Abnormal Lab Results - Last 24 Hours (Table)











  01/19/22 01/19/22 01/19/22 Range/Units





  10:00 11:35 16:45 


 


APTT  36.5 H    (22.0-30.0)  sec


 


POC Glucose (mg/dL)   192 H  162 H  (75-99)  mg/dL














  01/19/22 01/20/22 Range/Units





  20:12 06:33 


 


APTT    (22.0-30.0)  sec


 


POC Glucose (mg/dL)  197 H  186 H  (75-99)  mg/dL

## 2022-03-02 ENCOUNTER — HOSPITAL ENCOUNTER (OUTPATIENT)
Dept: HOSPITAL 47 - RADCTMAIN | Age: 67
Discharge: HOME | End: 2022-03-02
Attending: SURGERY
Payer: COMMERCIAL

## 2022-03-02 DIAGNOSIS — I70.213: Primary | ICD-10-CM

## 2022-03-02 LAB — BUN SERPL-SCNC: 24 MG/DL (ref 9–20)

## 2022-03-02 PROCEDURE — 75635 CT ANGIO ABDOMINAL ARTERIES: CPT

## 2022-03-02 PROCEDURE — 36415 COLL VENOUS BLD VENIPUNCTURE: CPT

## 2022-03-02 PROCEDURE — 84520 ASSAY OF UREA NITROGEN: CPT

## 2022-03-02 PROCEDURE — 82565 ASSAY OF CREATININE: CPT

## 2022-03-02 NOTE — CT
EXAMINATION TYPE: CT angio abd aorta w/Runoff

 

DATE OF EXAM: 3/2/2022

 

INDICATION: claudication

 

CT DLP: 2206 mGy.cm  Automated Exposure Control for Dose Reduction was Utilized.

 

TECHNIQUE AND CONTRAST: 

CT scan of the abdomen, pelvis and lower extremities is performed without and with IV Contrast, patie
nt injected with 125mL mL of Isovue 370. MIP and 3-D reconstruction images were performed and reviewe
d.

 

COMPARISON: None available

 

FINDINGS:

Status post abdominal aortic aneurysmal repair with endovascular stent extending to the iliac arterie
s. Another stent is seen in the left iliac artery extending to the common femoral artery. No gross en
dovascular leak. The aneurysmal sac measures 5.9 x 6.2 cm. Patent stents. Unremarkable celiac trunk, 
superior mesenteric artery and left renal artery demonstrating mild atherosclerotic calcifications. M
oderate stenosis of the origin of the right renal artery yet patent distally. The inferior mesenteric
 artery is not well seen opacified.

 

On the right side:

Atherosclerotic calcifications of the right iliac arteries and right common femoral artery without si
gnificant stenosis or occlusion. Severe stenosis at the bifurcation of the right common femoral arter
y extending to the proximal portion of the right superficial femoral artery by a calcified atheromato
us plaque extending for about 10 mm yet patent distally. Slightly atherosclerotic right common femora
l artery without significant stenosis or occlusion. Severely atherosclerotic right superficial femora
l artery demonstrating multiple segments of moderate stenosis. Severe stenosis seen at the inferior a
spect of the right superficial femoral artery by densely calcified atheromatous plaque extending from
 the mid to lower thigh and also involving the proximal portion of the popliteal artery yet patent di
stally.

 

Severe stenosis of the popliteal artery posterior to the distal femur by densely calcified atheromato
us plaque extending for about 6 mm yet patent distally. Focal ectasia of the popliteal artery measuri
ng up to 16mm and extending for about 16 mm. Severe stenosis of the distal aspect of the right poplit
eal artery by a densely calcified atheromatous plaque extending for about 2.2 cm. Atherosclerotic tib
ioperoneal trunk and visualized right leg arteries. Well-opacified posterior tibial and to a lesser e
xtent peroneal arteries down to the ankle with minimal opacification of the proximal anterior tibial 
artery. The dorsalis pedis artery is not opacified. Opacified plantar arteries.

 

On the left side:

Left common iliac artery aneurysm measuring 2.7 cm, patent. Patent left iliac arteries and left commo
n femoral artery. Unremarkable left deep femoral artery. Atherosclerotic calcifications of the left s
uperficial femoral artery. Suspected stent of the inferior aspect of the left common femoral artery e
xtending to the superior aspect of the popliteal artery. The popliteal artery is patent distal to the
 stent demonstrating focal ectasia measuring up to 13 mm. Severe stenosis of the inferior aspect of t
he popliteal artery posterior to the knee by a densely calcified atheromatous plaque extending for ab
out 15 mm yet patent distally. Complete occlusion of the tibioperoneal trunk just distal to the origi
n of the anterior tibial artery. Mild opacification of the left anterior and to a lesser extent the p
osterior tibial arteries with minimal opacification of the proximal left peroneal artery. Opacified d
orsalis pedis artery. The plantar arteries are not opacified

 

Other findings:

Cholelithiasis without evidence of acute cholecystitis. Posterior splenic cyst without suspicious fea
ture measuring 3.2 cm. Small pancreas. Second part of the duodenum diverticulum. Enlarged prostate, p
lease correlate with PSA level. Scattered uncomplicated colonic diverticulosis. Left knee arthroplast
y. Degenerative changes of the lumbar spine. Postsurgical changes in the left inguinal region. Left f
at-containing inguinal hernia. Suspected right lower extremity varicose veins.

 

IMPRESSION:

1. Status post abdominal aortic aneurysm repair with endovascular and left lower extremity arterial s
tents as described above, please correlate with the previous operative/intervention report.

2. Severe stenosis of the right femoral artery bifurcation, segments of right superficial femoral art
tory and right popliteal artery without complete occlusion as detailed above.

3. Occluded left tibioperoneal trunk with segments of left popliteal artery stenosis as described abo
ve.

4. Other atherosclerotic changes, segmental stenosis and incidental findings as detailed above.

## 2022-03-28 ENCOUNTER — HOSPITAL ENCOUNTER (INPATIENT)
Dept: HOSPITAL 47 - 2ORMAIN | Age: 67
LOS: 2 days | Discharge: HOME | DRG: 253 | End: 2022-03-30
Attending: SURGERY | Admitting: SURGERY
Payer: COMMERCIAL

## 2022-03-28 VITALS — BODY MASS INDEX: 33 KG/M2

## 2022-03-28 DIAGNOSIS — Z79.899: ICD-10-CM

## 2022-03-28 DIAGNOSIS — Z79.01: ICD-10-CM

## 2022-03-28 DIAGNOSIS — Z79.82: ICD-10-CM

## 2022-03-28 DIAGNOSIS — I25.10: ICD-10-CM

## 2022-03-28 DIAGNOSIS — I70.209: ICD-10-CM

## 2022-03-28 DIAGNOSIS — Z88.8: ICD-10-CM

## 2022-03-28 DIAGNOSIS — I10: ICD-10-CM

## 2022-03-28 DIAGNOSIS — Z96.652: ICD-10-CM

## 2022-03-28 DIAGNOSIS — I25.2: ICD-10-CM

## 2022-03-28 DIAGNOSIS — E66.9: ICD-10-CM

## 2022-03-28 DIAGNOSIS — Z95.5: ICD-10-CM

## 2022-03-28 DIAGNOSIS — E11.51: Primary | ICD-10-CM

## 2022-03-28 DIAGNOSIS — E78.5: ICD-10-CM

## 2022-03-28 DIAGNOSIS — Z79.4: ICD-10-CM

## 2022-03-28 DIAGNOSIS — Z87.891: ICD-10-CM

## 2022-03-28 LAB
APTT BLD: 23.7 SEC (ref 22–30)
BASOPHILS # BLD AUTO: 0.1 K/UL (ref 0–0.2)
BASOPHILS NFR BLD AUTO: 1 %
EOSINOPHIL # BLD AUTO: 0.3 K/UL (ref 0–0.7)
EOSINOPHIL NFR BLD AUTO: 4 %
ERYTHROCYTE [DISTWIDTH] IN BLOOD BY AUTOMATED COUNT: 4.85 M/UL (ref 4.3–5.9)
ERYTHROCYTE [DISTWIDTH] IN BLOOD: 13.5 % (ref 11.5–15.5)
GLUCOSE BLD-MCNC: 147 MG/DL (ref 75–99)
GLUCOSE BLD-MCNC: 164 MG/DL (ref 75–99)
GLUCOSE BLD-MCNC: 169 MG/DL (ref 75–99)
GLUCOSE BLD-MCNC: 197 MG/DL (ref 75–99)
HCT VFR BLD AUTO: 44.1 % (ref 39–53)
HGB BLD-MCNC: 14.3 GM/DL (ref 13–17.5)
INR PPP: 0.9 (ref ?–1.2)
LYMPHOCYTES # SPEC AUTO: 1.6 K/UL (ref 1–4.8)
LYMPHOCYTES NFR SPEC AUTO: 19 %
MCH RBC QN AUTO: 29.4 PG (ref 25–35)
MCHC RBC AUTO-ENTMCNC: 32.4 G/DL (ref 31–37)
MCV RBC AUTO: 91 FL (ref 80–100)
MONOCYTES # BLD AUTO: 0.5 K/UL (ref 0–1)
MONOCYTES NFR BLD AUTO: 6 %
NEUTROPHILS # BLD AUTO: 5.9 K/UL (ref 1.3–7.7)
NEUTROPHILS NFR BLD AUTO: 69 %
PLATELET # BLD AUTO: 276 K/UL (ref 150–450)
PT BLD: 10.3 SEC (ref 9–12)
WBC # BLD AUTO: 8.6 K/UL (ref 3.8–10.6)

## 2022-03-28 PROCEDURE — 04UK0JZ SUPPLEMENT RIGHT FEMORAL ARTERY WITH SYNTHETIC SUBSTITUTE, OPEN APPROACH: ICD-10-PCS

## 2022-03-28 PROCEDURE — 85730 THROMBOPLASTIN TIME PARTIAL: CPT

## 2022-03-28 PROCEDURE — 85610 PROTHROMBIN TIME: CPT

## 2022-03-28 PROCEDURE — 88311 DECALCIFY TISSUE: CPT

## 2022-03-28 PROCEDURE — 80048 BASIC METABOLIC PNL TOTAL CA: CPT

## 2022-03-28 PROCEDURE — 85027 COMPLETE CBC AUTOMATED: CPT

## 2022-03-28 PROCEDURE — 04CK0ZZ EXTIRPATION OF MATTER FROM RIGHT FEMORAL ARTERY, OPEN APPROACH: ICD-10-PCS

## 2022-03-28 PROCEDURE — 85025 COMPLETE CBC W/AUTO DIFF WBC: CPT

## 2022-03-28 PROCEDURE — 88304 TISSUE EXAM BY PATHOLOGIST: CPT

## 2022-03-28 RX ADMIN — POTASSIUM CHLORIDE SCH MLS: 14.9 INJECTION, SOLUTION INTRAVENOUS at 08:13

## 2022-03-28 RX ADMIN — CEFAZOLIN SCH: 330 INJECTION, POWDER, FOR SOLUTION INTRAMUSCULAR; INTRAVENOUS at 17:37

## 2022-03-28 RX ADMIN — METOPROLOL TARTRATE SCH MG: 25 TABLET, FILM COATED ORAL at 20:06

## 2022-03-28 RX ADMIN — INSULIN ASPART SCH UNIT: 100 INJECTION, SOLUTION INTRAVENOUS; SUBCUTANEOUS at 20:06

## 2022-03-28 RX ADMIN — PRAVASTATIN SODIUM SCH MG: 40 TABLET ORAL at 20:06

## 2022-03-28 RX ADMIN — INSULIN ASPART SCH: 100 INJECTION, SOLUTION INTRAVENOUS; SUBCUTANEOUS at 17:37

## 2022-03-28 NOTE — P.CONS
History of Present Illness





- History of Present Illness





This is a pleasant 67 result male with past medical history of coronary artery 

disease status post stent, Diabetes Mellitus, Hyperlipidemia, Hypertension, 

peripheral vascular disease


He was admitted for right lower extremity intermittent claudication status post 

right common femoral, superficial femoral and profunda femoris endarterectomy 

with patch angioplasty.  Today is postoperative day #0.


Patient was seen in the recovery room, he was awake, comfortable pain controlled

.  Denies chest pain or abdominal pain.  No vomiting.  No fever.


Hemodynamically stable.


CBC unremarkable with INR normal 0.9.


Glucose controlled











Review of Systems





Review of systems


CONSTITUTIONAL: No fever, no malaise, no fatigue. 


HEENT: No recent visual problems or hearing problems. Denied any sore throat. 


CARDIOVASCULAR: No  orthopnea, PND, no palpitations, no syncope. 


PULMONARY: No shortness of breath, no cough, no hemoptysis. 


GASTROINTESTINAL: No diarrhea, no nausea, no vomiting, no abdominal pain. 

Normoactive bowel sounds. 


NEUROLOGICAL: No headaches, no weakness, no numbness. 


HEMATOLOGICAL: Denies any bleeding or petechiae. 


GENITOURINARY: Denies any burning micturition, frequency, or urgency. 


MUSCULOSKELETAL/RHEUMATOLOGICAL: Denies any joint pain, swelling, or any muscle 

pain. 


ENDOCRINE: Denies any polyuria or polydipsia.


ROS unobtainable: due to endotracheal tube





Past Medical History


Past Medical History: Diabetes Mellitus, Hyperlipidemia, Hypertension, 

Myocardial Infarction (MI), Vascular Disorder


Additional Past Medical History / Comment(s): "two bad"  DISCS WITH BACK PAIN,  

VARICOSE VEINS, "abdominal" aneurysm


Last Myocardial Infarction Date:: 2013


History of Any Multi-Drug Resistant Organisms: None Reported


Past Surgical History: Heart Catheterization With Stent, Joint Replacement, 

Orthopedic Surgery


Additional Past Surgical History / Comment(s): 1-22-22 Prcutaneous Endovascular 

Aortic Repair,LEFT SHOULDER Rotator cuff, VARICOSE VEINS, partial left knee 

replacement, arthroscopy madeline knee, one cardiac stent


Past Anesthesia/Blood Transfusion Reactions: No Reported Reaction


Date of Last Stent Placement:: 2013


Smoking Status: Former smoker





- Past Family History


  ** Mother


Family Medical History: Cancer


Additional Family Medical History / Comment(s): OVARIAN CANCER





Medications and Allergies


                                Home Medications











 Medication  Instructions  Recorded  Confirmed  Type


 


Aspirin 325 mg PO DAILY 07/11/17 03/28/22 History


 


Pioglitazone [Actos] 15 mg PO DAILY 01/12/22 03/28/22 History


 


metFORMIN  mg PO BID 01/12/22 03/28/22 History


 


Apixaban [Eliquis] 2.5 mg PO BID #60 tab 01/19/22 03/28/22 Rx


 


Metoprolol Tartrate [Lopressor] 25 mg PO BID 30 Days #60 tab 01/20/22 03/28/22 

Rx


 


Pravastatin Sodium [Pravachol] 40 mg PO HS 30 Days #30 tab 01/20/22 03/28/22 Rx


 


lisinopriL [Zestril] 5 mg PO QAM 03/22/22 03/28/22 History








                                    Allergies











Allergy/AdvReac Type Severity Reaction Status Date / Time


 


Quinolones Allergy Unknown Rash/Hives Verified 03/28/22 07:47














Physical Exam


Vitals: 


                                   Vital Signs











  Temp Pulse Pulse Resp BP BP Pulse Ox


 


 03/28/22 12:50   92   16  121/55   99


 


 03/28/22 12:35    92  16   118/58  100


 


 03/28/22 12:19    94  16   129/60  100


 


 03/28/22 07:54  97.1 F L   73  18   149/77  96








                                Intake and Output











 03/27/22 03/28/22 03/28/22





 22:59 06:59 14:59


 


Intake Total   1452


 


Output Total   650


 


Balance   802


 


Intake:   


 


  IV   1452


 


Output:   


 


  Urine   500


 


  Estimated Blood Loss   150


 


Other:   


 


  Weight   113.9 kg














-GENERAL: The patient is alert and oriented x3, not in any acute distress.  

Obese


HEENT: Pupils are round and equally reacting to light. EOMI. No scleral icterus.

 No conjunctival pallor. Normocephalic, atraumatic. No pharyngeal erythema. No 

thyromegaly. 


CARDIOVASCULAR: S1 and S2 present. No murmurs, rubs, or gallops. 


PULMONARY: Chest is clear to auscultation, no wheezing or crackles. 


ABDOMEN: Soft, nontender, nondistended, normoactive bowel sounds. No palpable 

organomegaly. 


MUSCULOSKELETAL: No joint swelling or deformity. 


-EXTREMITIES: No cyanosis, clubbing, or pedal edema.  Right lower extremity 

surgical wound closed, and a dressing, rest of exam is deferred to surgery 

primary team


NEUROLOGICAL: Gross neurological examination did not reveal any focal deficits. 


SKIN: No rashes. no petechiae.





Results


CBC & Chem 7: 


                                 03/28/22 08:12





Labs: 


                  Abnormal Lab Results - Last 24 Hours (Table)











  03/28/22 Range/Units





  08:14 


 


POC Glucose (mg/dL)  147 H  (75-99)  mg/dL














Assessment and Plan


Assessment: 





Assessment and plan


-PVD,status post right common femoral, superficial femoral and profunda femoris 

endarterectomy with patch angioplasty


-Diabetes mellitus: On metformin, and Actos, continue with insulin sliding scale


-Hypertension: Continue with lisinopril, metoprolol


-Hyperlipidemia: Continue with statin


-History of coronary artery disease status post stent, on aspirin and Eliquis


-Obesity with BMI of 53.1





Thank you for consulting us

## 2022-03-28 NOTE — P.OP
Date of Procedure: 03/28/22


Preoperative Diagnosis: 


Right lower extremity claudication Lalitha classification 3, common femoral 

artery stenosis


Postoperative Diagnosis: 


Same


 Right superficial femoral artery atherosclerotic occlusive disease with distal 

occlusion and reconstitution behind the knee


 One-vessel runoff to the ankle


Procedure(s) Performed: 


Right common femoral, superficial femoral and profundus femoris artery 

endarterectomy with patch angioplasty


 Right lower extremity selective angiogram


Anesthesia: GETA


Surgeon: Markell Costello


Estimated Blood Loss (ml): 150


Pathology: other (Femoral plaque)


Condition: stable


Disposition: PACU


Indications for Procedure: 


67-year-old gentleman with history of endovascular aortic repair with left 

common femoral artery endarterectomy and SFA stenting presented to the office 

secondary to right lower extremity pain with ambulation.  Patient states he is 

only able to walk  feet without significant pain in his right thigh and 

calf.  He denies any pain at night.  He does admit to recently cutting his 

toenails and developing a small wound at his great toe.  He had previously a CT 

angiogram of the abdomen and pelvis with runoff demonstrating occlusive disease 

greater than 90% at the common femoral, profundus femoris and superficial 

femoral artery takeoff with multiple areas of stenosis throughout the 

superficial femoral artery.  Dense calcification noted throughout.  After 

discussion with the patient he would benefit from a common femoral artery 

endarterectomy with possible intervention of the lower leg.  He presents today 

for endarterectomy and patch angioplasty.


Operative Findings: 


Dense calcification noted from the external iliac artery extending to the common

femoral, profundus femoris and superficial femoral artery.  There was brisk 

backbleeding from the profundus femoris as well as the superficial femoral 

artery.





Upon angiogram flow was noted through the superficial femoral artery with 

multiple areas of calcification and one area of stenosis noted at the midportion

of the SFA.  At the distal aspect of the SFA at Andrea's canal there was an 

occlusion with reconstitution behind the knee.  The popliteal artery was 

occluded as well as with reconstitution below the knee at the TPT trunk.  The TP

T trunk had slow flow within this area with filling distally to the peroneal 

artery down to the ankle extending into the foot.


Description of Procedure: 


After written and informed consent was obtained the patient and all risks, 

benefits and complications were described the patient is brought to the 

operative suite and laid in a supine position.  The area of the right groin and 

leg was prepped and draped in usual sterile fashion after appropriate anesthetic

was performed per the anesthesiologist.  A timeout was performed in normal 

fashion antibiotics were administered prior to incision.  A vertical incision 

was then created with a 10 blade scalpel and dissection was carried down to the 

common femoral artery utilizing electrocautery.  Upon dissection there was some 

scar tissue noted from the previous aortic repair and Perclose sutures were 

encountered.  The common femoral, profundus femoris and superficial femoral 

artery were meticulously dissected free in a circumferential manner and 

controlled with vessel loops.  Once controlled patient was administered heparin 

and followed with serial ACTs.  Control was obtained and arteriotomy was then 

created with 11 blade scalpel and extended with Pott Pinedo scissors.  The 

inflamed area of the previous closure device was resected.  Endarterectomy was 

then performed with an endarterectomy spatula to the external iliac artery where

there was a posterior plaque tongue noted and removed with a stat.  

Endarterectomy was extended into the profundus femoris where eversion technique 

was utilized and a large plaque was removed.  The plaque was then feathered into

the superficial femoral artery.  Backbleeding was noted from the SFA and 

profundus femoris with good brisk pulsatile bleeding from the external iliac 

artery.  7-0 Prolene is then used to tack the distal endarterectomy aspect in 

the superficial femoral artery.  Once tacked a bovine pericardial patch was 

utilized and utilizing 6-0 Prolene suture patch angioplasty was performed.  Once

completed backbleeding was assessed once again revealing good brisk backbleeding

from the profundus femoris and superficial femoral artery.  The superficial 

femoral artery was then clamped once again and the inflow was released flushing 

any debris into the deep system.  All control was released revealing good 

pulsatile blood flow within the patch as well as distal to the patch in the 

superficial femoral artery and profundus femoris artery.  The area was irrigated

and suctioned dry hemostasis was assured with Gelfoam and thrombin.  Due to his 

significant disease utilizing a butterfly needle the patch was accessed and 

right lower extremity angiogram was obtained demonstrating brisk flow to the 

superficial femoral artery with areas of stenosis and occlusion behind the knee.

 There was reconstitution and flow noted to the foot via one-vessel runoff at 

the peroneal.  Due to the severity of the disease the procedure was concluded 

and if this does not help with his pain he would then need to return for attempt

at retrograde access and atherectomy of the behind knee popliteal versus a 

below-knee bypass.  Patient tolerated procedure well and was sent to PACU for 

recovery.

## 2022-03-28 NOTE — FL
EXAMINATION TYPE: FL guidance operating room

 

DATE OF EXAM: 3/28/2022

 

FLUOROSCOPY

 

Fluoroscopy time of 1 minute 46 seconds was used during femoral endarterectomy.  1243 image/s documen
t/s the procedure. Many of the images are included in cine series.

## 2022-03-28 NOTE — P.ANPRN
Procedure Note - Anesthesia





- Invasive Line


  ** Right Arterial Line


Time Out Performed: Yes


Date of Procedure: 03/28/22


Time of Procedure: 08:20


Location of Patient: PreOp


Preparation: Sterile Prep, Sterile Dressing


Arterial Line Location: Radial


Ultrasound Used: No


Purpose - Visualization and  Identification of Vasculature: No


Narrative: 


Central line placement per sterile protocol utilized.

## 2022-03-29 LAB
ANION GAP SERPL CALC-SCNC: 8 MMOL/L
BUN SERPL-SCNC: 18 MG/DL (ref 9–20)
CALCIUM SPEC-MCNC: 8.8 MG/DL (ref 8.4–10.2)
CHLORIDE SERPL-SCNC: 101 MMOL/L (ref 98–107)
CO2 SERPL-SCNC: 29 MMOL/L (ref 22–30)
ERYTHROCYTE [DISTWIDTH] IN BLOOD BY AUTOMATED COUNT: 4.28 M/UL (ref 4.3–5.9)
ERYTHROCYTE [DISTWIDTH] IN BLOOD: 13.8 % (ref 11.5–15.5)
GLUCOSE BLD-MCNC: 153 MG/DL (ref 75–99)
GLUCOSE BLD-MCNC: 172 MG/DL (ref 75–99)
GLUCOSE BLD-MCNC: 181 MG/DL (ref 75–99)
GLUCOSE BLD-MCNC: 211 MG/DL (ref 75–99)
GLUCOSE SERPL-MCNC: 176 MG/DL (ref 74–99)
HCT VFR BLD AUTO: 39.5 % (ref 39–53)
HGB BLD-MCNC: 12.4 GM/DL (ref 13–17.5)
MCH RBC QN AUTO: 29 PG (ref 25–35)
MCHC RBC AUTO-ENTMCNC: 31.4 G/DL (ref 31–37)
MCV RBC AUTO: 92.5 FL (ref 80–100)
PLATELET # BLD AUTO: 247 K/UL (ref 150–450)
POTASSIUM SERPL-SCNC: 4.4 MMOL/L (ref 3.5–5.1)
SODIUM SERPL-SCNC: 138 MMOL/L (ref 137–145)
WBC # BLD AUTO: 11.9 K/UL (ref 3.8–10.6)

## 2022-03-29 RX ADMIN — ONDANSETRON PRN MG: 2 INJECTION INTRAMUSCULAR; INTRAVENOUS at 02:21

## 2022-03-29 RX ADMIN — ASPIRIN 325 MG ORAL TABLET SCH MG: 325 PILL ORAL at 11:22

## 2022-03-29 RX ADMIN — INSULIN ASPART SCH UNIT: 100 INJECTION, SOLUTION INTRAVENOUS; SUBCUTANEOUS at 06:17

## 2022-03-29 RX ADMIN — ONDANSETRON PRN MG: 2 INJECTION INTRAMUSCULAR; INTRAVENOUS at 08:49

## 2022-03-29 RX ADMIN — POTASSIUM CHLORIDE SCH: 14.9 INJECTION, SOLUTION INTRAVENOUS at 08:44

## 2022-03-29 RX ADMIN — CEFAZOLIN SCH MLS/HR: 330 INJECTION, POWDER, FOR SOLUTION INTRAMUSCULAR; INTRAVENOUS at 17:08

## 2022-03-29 RX ADMIN — PIOGLITAZONE SCH: 15 TABLET ORAL at 15:17

## 2022-03-29 RX ADMIN — INSULIN ASPART SCH UNIT: 100 INJECTION, SOLUTION INTRAVENOUS; SUBCUTANEOUS at 20:23

## 2022-03-29 RX ADMIN — PANTOPRAZOLE SODIUM SCH MG: 40 INJECTION, POWDER, FOR SOLUTION INTRAVENOUS at 08:49

## 2022-03-29 RX ADMIN — INSULIN ASPART SCH UNIT: 100 INJECTION, SOLUTION INTRAVENOUS; SUBCUTANEOUS at 17:08

## 2022-03-29 RX ADMIN — APIXABAN SCH MG: 2.5 TABLET, FILM COATED ORAL at 20:23

## 2022-03-29 RX ADMIN — METOPROLOL TARTRATE SCH MG: 25 TABLET, FILM COATED ORAL at 11:23

## 2022-03-29 RX ADMIN — PANTOPRAZOLE SODIUM SCH MG: 40 INJECTION, POWDER, FOR SOLUTION INTRAVENOUS at 20:23

## 2022-03-29 RX ADMIN — METOPROLOL TARTRATE SCH MG: 25 TABLET, FILM COATED ORAL at 20:23

## 2022-03-29 RX ADMIN — PRAVASTATIN SODIUM SCH MG: 40 TABLET ORAL at 20:23

## 2022-03-29 RX ADMIN — INSULIN ASPART SCH: 100 INJECTION, SOLUTION INTRAVENOUS; SUBCUTANEOUS at 14:00

## 2022-03-29 RX ADMIN — APIXABAN SCH MG: 2.5 TABLET, FILM COATED ORAL at 11:22

## 2022-03-29 RX ADMIN — CEFAZOLIN SCH: 330 INJECTION, POWDER, FOR SOLUTION INTRAMUSCULAR; INTRAVENOUS at 05:01

## 2022-03-29 NOTE — P.PN
Subjective


Progress Note Date: 03/29/22





Patient seen and examined lying in bed.  He is postop day #1 for right common 

femoral, superficial femoral and profunda femoris artery endarterectomy with 

patch angioplasty.  He states he had nausea and vomiting through the night and 

this morning about 1 hour ago.  He believes he ate something bad.  He states his

pain is well controlled, improvement to the right lower extremity.  He denies 

any abdominal pain.  He has been afebrile.  IV fluids are running in 100 mL per 

hour.  He is able to move his right lower extremity.





Objective





- Vital Signs


Vital signs: 


                                   Vital Signs











Temp  97.9 F   03/28/22 19:30


 


Pulse  114 H  03/29/22 02:25


 


Resp  18   03/29/22 02:25


 


BP  122/67   03/29/22 02:25


 


Pulse Ox  92 L  03/29/22 02:25








                                 Intake & Output











 03/28/22 03/29/22 03/29/22





 18:59 06:59 18:59


 


Intake Total 2902 1000 


 


Output Total 1050 2200 


 


Balance 1852 -1200 


 


Weight 113.9 kg  


 


Intake:   


 


  IV 2052  


 


  Oral 850 1000 


 


Output:   


 


  Urine 900 2200 


 


    Uretheral ([place])  200 


 


  Estimated Blood Loss 150  


 


Other:   


 


  Voiding Method  Indwelling Catheter 














- Exam





General appearance: The patient is alert, oriented, appears in no acute 

distress.


HET: Head is normocephalic and atraumatic.  Pupils are equal and reactive.  


Neck: Supple without lymphadenopathy.  Trachea midline.  No audible carotid 

bruit.


Heart: S1 S2.  Regular rate and rhythm.


Lungs: Clear to auscultation bilaterally.


Abdomen: Soft, nontender, nondistended, positive bowel sounds.


Extremities: Normal skin color and turgor.  Right lower extremity warm to touch,

toes have some edema, able to move right lower extremity including toes.  Right 

groin with Prevena dressing intact with good suction.  Doppler signal present 

for popliteal, posterior tibialis and dorsalis pedis.


Neurological: No focal deficits.  Strength and sensation are grossly intact.





- Labs


CBC & Chem 7: 


                                 03/29/22 08:40





                                 03/29/22 08:40


Labs: 


                  Abnormal Lab Results - Last 24 Hours (Table)











  03/28/22 03/28/22 03/28/22 Range/Units





  08:14 13:09 16:47 


 


POC Glucose (mg/dL)  147 H  169 H  164 H  (75-99)  mg/dL














  03/28/22 03/29/22 Range/Units





  19:54 06:02 


 


POC Glucose (mg/dL)  197 H  153 H  (75-99)  mg/dL














Assessment and Plan


Assessment: 





1.  Postop day #1 for right common femoral, superficial femoral and profunda fe

luann artery endarterectomy with patch angioplasty with right lower extremity 

selective angiogram


2.  Right lower extremity claudication Lalitha classification 3


3.  Common femoral artery stenosis


4.  Right superficial femoral artery arthrosclerotic occlusive disease with 

distal occlusion and reconstitution behind the knee with one-vessel runoff to 

the ankle


5.  History of endovascular aortic repair


6.  History left common femoral artery endarterectomy and SFA stenting


7.  Nausea and vomiting


Plan: 





1.  Continue diet as tolerated


2.  Continue Zofran for nausea and vomiting


3.  Protonix 40 mg twice a day added


4.  CBC, BMP ordered


5.  Increase activity/encourage ambulation


6.  Continue medical management/diabetic management per medicine team


7.  Keep Prevena dressing intact for 7 days


8.  Anticipate discharge in the next 24 hours





The impression and plan of care has been dictated as directed.








I performed a history and examination of this patient,  discussed the same with 

the dictator.  I agree with the dictator's note ,documented as a scribe.  Any 

additional findings or plans will be noted.

## 2022-03-29 NOTE — P.PN
Subjective





This is a pleasant 67 result male with past medical history of coronary artery 

disease status post stent, Diabetes Mellitus, Hyperlipidemia, Hypertension, 

peripheral vascular disease


He was admitted for right lower extremity intermittent claudication status post 

right common femoral, superficial femoral and profunda femoris endarterectomy 

with patch angioplasty.  Today is postoperative day #0.


Patient was seen in the recovery room, he was awake, comfortable pain 

controlled.  Denies chest pain or abdominal pain.  No vomiting.  No fever.


Hemodynamically stable.


CBC unremarkable with INR normal 0.9.


Glucose controlled





03/29/2022


Patient awake and alert, resting comfortably in bed and asymptomatic.


His hemodynamically stable, is not tachycardic and he is saturating on room air.


WBCs 11.9, hemoglobin 12.4, glucose controlled on Actos, metformin hasn't been 

restarted.  Patient 18 between % of his medial, deep holding metformin.


Right groin wound with wound VAC in place.  No leg pain or swelling with normal 

range of movement for the right leg.


He has some nausea which is controlled his Zofran but it's affecting his 

appetite.  He denies any other GI symptoms.


Patient states his on Eliquis since January after he had a procedure for his 

left leg PVD with Dr. turner


Currently kept on his home dose of Eliquis, aspirin also he is on normal saline 

100 mL per hour.  Also his on Protonix twice a day





Objective





- Vital Signs


Vital signs: 


                                   Vital Signs











Temp  97.9 F   03/28/22 19:30


 


Pulse  114 H  03/29/22 02:25


 


Resp  18   03/29/22 02:25


 


BP  122/67   03/29/22 02:25


 


Pulse Ox  92 L  03/29/22 02:25








                                 Intake & Output











 03/28/22 03/29/22 03/29/22





 18:59 06:59 18:59


 


Intake Total 2902 1000 110


 


Output Total 1050 2200 


 


Balance 1852 -1200 110


 


Weight 113.9 kg  


 


Intake:   


 


  IV 2052  


 


  Oral 850 1000 110


 


Output:   


 


  Urine 900 2200 


 


    Uretheral ([place])  200 


 


  Estimated Blood Loss 150  


 


Other:   


 


  Voiding Method  Indwelling Catheter 














- Labs


CBC & Chem 7: 


                                 03/29/22 08:40





                                 03/29/22 08:40


Labs: 


                  Abnormal Lab Results - Last 24 Hours (Table)











  03/28/22 03/28/22 03/28/22 Range/Units





  13:09 16:47 19:54 


 


WBC     (3.8-10.6)  k/uL


 


RBC     (4.30-5.90)  m/uL


 


Hgb     (13.0-17.5)  gm/dL


 


Glucose     (74-99)  mg/dL


 


POC Glucose (mg/dL)  169 H  164 H  197 H  (75-99)  mg/dL














  03/29/22 03/29/22 03/29/22 Range/Units





  06:02 08:40 08:40 


 


WBC   11.9 H   (3.8-10.6)  k/uL


 


RBC   4.28 L   (4.30-5.90)  m/uL


 


Hgb   12.4 L   (13.0-17.5)  gm/dL


 


Glucose    176 H  (74-99)  mg/dL


 


POC Glucose (mg/dL)  153 H    (75-99)  mg/dL














Assessment and Plan


Assessment: 





Assessment and plan


-PVD,status post right common femoral, superficial femoral and profunda femoris 

endarterectomy with patch angioplasty.  Continue with aspirin and Eliquis 


-postop nausea continue with symptomatic treatment, no abdominal pain.  This 

affects his diet.  Keep metformin on hold


-Diabetes mellitus: On metformin(on hold now), and Actos, continue with insulin 

sliding scale


-Hypertension: Continue with lisinopril, metoprolol


-Hyperlipidemia: Continue with statin


-History of coronary artery disease status post stent, on aspirin and Eliquis


-Obesity with BMI of 53.1





Thank you for consulting us

## 2022-03-30 VITALS
SYSTOLIC BLOOD PRESSURE: 138 MMHG | TEMPERATURE: 98 F | RESPIRATION RATE: 18 BRPM | DIASTOLIC BLOOD PRESSURE: 71 MMHG | HEART RATE: 71 BPM

## 2022-03-30 LAB
GLUCOSE BLD-MCNC: 148 MG/DL (ref 75–99)
GLUCOSE BLD-MCNC: 194 MG/DL (ref 75–99)

## 2022-03-30 RX ADMIN — PANTOPRAZOLE SODIUM SCH MG: 40 INJECTION, POWDER, FOR SOLUTION INTRAVENOUS at 09:41

## 2022-03-30 RX ADMIN — APIXABAN SCH MG: 2.5 TABLET, FILM COATED ORAL at 09:41

## 2022-03-30 RX ADMIN — METOPROLOL TARTRATE SCH MG: 25 TABLET, FILM COATED ORAL at 09:41

## 2022-03-30 RX ADMIN — INSULIN ASPART SCH UNIT: 100 INJECTION, SOLUTION INTRAVENOUS; SUBCUTANEOUS at 06:29

## 2022-03-30 RX ADMIN — POTASSIUM CHLORIDE SCH: 14.9 INJECTION, SOLUTION INTRAVENOUS at 05:23

## 2022-03-30 RX ADMIN — ASPIRIN 325 MG ORAL TABLET SCH MG: 325 PILL ORAL at 09:41

## 2022-03-30 RX ADMIN — CEFAZOLIN SCH: 330 INJECTION, POWDER, FOR SOLUTION INTRAMUSCULAR; INTRAVENOUS at 05:23

## 2022-03-30 RX ADMIN — CEFAZOLIN SCH: 330 INJECTION, POWDER, FOR SOLUTION INTRAMUSCULAR; INTRAVENOUS at 05:22

## 2022-03-30 RX ADMIN — PIOGLITAZONE SCH MG: 15 TABLET ORAL at 09:41

## 2022-03-30 NOTE — P.PN
Subjective





This is a pleasant 67 result male with past medical history of coronary artery 

disease status post stent, Diabetes Mellitus, Hyperlipidemia, Hypertension, 

peripheral vascular disease


He was admitted for right lower extremity intermittent claudication status post 

right common femoral, superficial femoral and profunda femoris endarterectomy 

with patch angioplasty.  Today is postoperative day #0.


Patient was seen in the recovery room, he was awake, comfortable pain 

controlled.  Denies chest pain or abdominal pain.  No vomiting.  No fever.


Hemodynamically stable.


CBC unremarkable with INR normal 0.9.


Glucose controlled





03/29/2022


Patient awake and alert, resting comfortably in bed and asymptomatic.


His hemodynamically stable, is not tachycardic and he is saturating on room air.


WBCs 11.9, hemoglobin 12.4, glucose controlled on Actos, metformin hasn't been 

restarted.  Patient 18 between % of his medial, deep holding metformin.


Right groin wound with wound VAC in place.  No leg pain or swelling with normal 

range of movement for the right leg.


He has some nausea which is controlled his Zofran but it's affecting his 

appetite.  He denies any other GI symptoms.


Patient states his on Eliquis since January after he had a procedure for his 

left leg PVD with Dr. turner


Currently kept on his home dose of Eliquis, aspirin also he is on normal saline 

100 mL per hour.  Also his on Protonix twice a day 





03/30/2022


Patient is asymptomatic today, no chest pain or dyspnea or other symptoms


His nausea improved, and he is tolerating diet well.


History is controlled on Actos, metformin was on hold.  Patient states that he 

has a glucometer at home he was instructed to check his glucose 4 times a day 

and if his diet improves sugar on goes up and if measured more than 200 on 2 

occasions and he was instructed to resume his metformin at home and he 

verbalized understanding and acceptance.


Also patient was instructed to follow up with his PCP as an outpatient in 1 week

with Dr. Green and he agrees.  See discharge instructions











Objective





- Vital Signs


Vital signs: 


                                   Vital Signs











Temp  98.0 F   03/30/22 08:00


 


Pulse  71   03/30/22 08:00


 


Resp  18   03/30/22 08:00


 


BP  138/71   03/30/22 08:00


 


Pulse Ox  98   03/30/22 08:00








                                 Intake & Output











 03/29/22 03/30/22 03/30/22





 18:59 06:59 18:59


 


Intake Total 2310  120


 


Output Total 200 1200 275


 


Balance 2110 -1200 -155


 


Intake:   


 


  Intake, IV Titration 1600  





  Amount   


 


    Sodium Chloride 0.9% 1, 1600  





    000 ml @ 100 mls/hr IV .   





    Q10H [facility] Rx#:[id number]   


 


  Oral 710  120


 


Output:   


 


  Urine  1200 275


 


    Uretheral ([place])  700 


 


  Emesis 200  


 


Other:   


 


  Voiding Method Indwelling Catheter  


 


  # Voids   1














- Exam





GENERAL: The patient is alert and oriented x3, not in any acute distress. Well 

developed, well nourished. 


HEENT: Pupils are round and equally reacting to light. EOMI. No scleral icterus.

No conjunctival pallor. Normocephalic, atraumatic. No pharyngeal erythema. No 

thyromegaly. 


CARDIOVASCULAR: S1 and S2 present. No murmurs, rubs, or gallops. 


PULMONARY: Chest is clear to auscultation, no wheezing or crackles. 


ABDOMEN: Soft, nontender, nondistended, normoactive bowel sounds. No palpable 

organomegaly. 


MUSCULOSKELETAL: No joint swelling or deformity. 


EXTREMITIES: No cyanosis, clubbing, or pedal edema. 


NEUROLOGICAL: Gross neurological examination did not reveal any focal deficits. 


SKIN: No rashes. no petechiae.





- Labs


CBC & Chem 7: 


                                 03/29/22 08:40





                                 03/29/22 08:40


Labs: 


                  Abnormal Lab Results - Last 24 Hours (Table)











  03/29/22 03/29/22 03/30/22 Range/Units





  16:28 20:12 06:26 


 


POC Glucose (mg/dL)  211 H  172 H  148 H  (75-99)  mg/dL














  03/30/22 Range/Units





  10:52 


 


POC Glucose (mg/dL)  194 H  (75-99)  mg/dL














Assessment and Plan


Assessment: 





Assessment and plan


-PVD,status post right common femoral, superficial femoral and profunda femoris 

endarterectomy with patch angioplasty.  Continue with aspirin and Eliquis 


-postop nausea continue with symptomatic treatment, no abdominal pain.  This 

affects his diet.  Keep metformin on hold


-Diabetes mellitus: On metformin(on hold now), and Actos, continue with insulin 

sliding scale.  Patient instructed to check of his glucose more than 200 then to

resume metformin at home and he agrees.  Patient said he will check his sugar 4 

times a day as instructed


-Hypertension: Continue with lisinopril, metoprolol


-Hyperlipidemia: Continue with statin


-History of coronary artery disease status post stent, on aspirin and Eliquis


-Obesity with BMI of 53.1 patient medically looks his stable








Thank you for consulting us

## 2022-03-30 NOTE — P.DS
Providers


Date of admission: 


03/28/22 07:03





Expected date of discharge: 03/30/22


Attending physician: 


Markell Costello DO





Consults: 





                                        





03/28/22 12:19


Consult Physician Routine 


   Consulting Provider: Laurel Jackson


   Consult Reason/Comments: medical management


   Do you want consulting provider notified?: Yes











Primary care physician: 


[facility]





[facility] Course: 





Discharge diagnosis:   Postop day #2 for right common femoral, superficial 

femoral and profunda femoris artery endarterectomy with patch angioplasty with 

right lower extremity selective angiogram for Right lower extremity claudication

[place] classification 3, Common femoral artery stenosis, and Right 

superficial femoral artery arthrosclerotic occlusive disease with distal 

occlusion and reconstitution behind the knee with one-vessel runoff to the ankle








This 67-year-old male who presented for scheduled for revascularization of the 

right lower extremity for right lower extremity claudication [place] class

ification 3 and common femoral artery stenosis.  He is postop day #2 for right 

common femoral, superficial femoral and profunda femoris artery endarterectomy 

with patch angioplasty.  He had some nausea and vomiting and on the initial 

first night and morning however that has subsided.  His Parham catheter had been 

discontinued and he has been up and ambulating.  Pain to the right lower 

extremity is improved.  He's been afebrile.  He has a Prevena dressing to the 

right groin that is intact with good suction.right lower extremity warm to the 

touch, able to wiggle his toes.  PT and DP Doppler signal obtained.   He denies 

any pain and has not been using any pain medication during his hospital stay.








The impression and plan of care has been dictated as directed.








I performed a history and examination of this patient,  discussed the same with 

the dictator.  I agree with the dictator's note ,documented as a scribe.  Any 

additional findings or plans will be noted.


Procedures: 





Right common femoral, superficial femoral and profundus femoris artery 

endarterectomy with patch angioplasty


Right lower extremity selective angiogram





Patient Condition at Discharge: Stable





Plan - Discharge Summary


Discharge Rx Participant: No


New Discharge Prescriptions: 


No Action


   Aspirin 325 mg PO DAILY


   metFORMIN  mg PO BID


   Pioglitazone [Actos] 15 mg PO DAILY


   Metoprolol Tartrate [Lopressor] 25 mg PO BID 30 Days #60 tab


   Pravastatin Sodium [Pravachol] 40 mg PO HS 30 Days #30 tab


   Apixaban [Eliquis] 2.5 mg PO BID #60 tab


   lisinopriL [Zestril] 5 mg PO QAM


Discharge Medication List





Aspirin 325 mg PO DAILY 07/11/17 [History]


Pioglitazone [Actos] 15 mg PO DAILY 01/12/22 [History]


metFORMIN  mg PO BID 01/12/22 [History]


Apixaban [Eliquis] 2.5 mg PO BID #60 tab 01/19/22 [Rx]


Metoprolol Tartrate [Lopressor] 25 mg PO BID 30 Days #60 tab 01/20/22 [Rx]


Pravastatin Sodium [Pravachol] 40 mg PO HS 30 Days #30 tab 01/20/22 [Rx]


lisinopriL [Zestril] 5 mg PO QAM 03/22/22 [History]








Follow up Appointment(s)/Referral(s): 


Markell Costello DO [STAFF PHYSICIAN] - 1 Week


Activity/Diet/Wound Care/Special Instructions: 


Keep Prevena dressing in place to right groin for 6 more days then may remove 

remove and discard.  May shower, no tub bathing or soaking.


No heavy lifting or strenuous activity greater than 5-10 pounds until cleared by

surgeon.





Heart healthy low carbohydrate diet 1800 kcal per day





PT is restricted till you see your doctor











we recommend to check your glucose 4 times a day before each meal and at bed 

time , keep the results in a log book and bring it to your doctor upon your 

appointment date


if your glucose is less than 70 or more than 400 then call 911 and come to 

emergency room


Discharge Disposition: HOME SELF-CARE